# Patient Record
Sex: FEMALE | Race: WHITE | NOT HISPANIC OR LATINO | Employment: OTHER | URBAN - METROPOLITAN AREA
[De-identification: names, ages, dates, MRNs, and addresses within clinical notes are randomized per-mention and may not be internally consistent; named-entity substitution may affect disease eponyms.]

---

## 2023-10-03 ENCOUNTER — APPOINTMENT (OUTPATIENT)
Dept: RADIOLOGY | Facility: MEDICAL CENTER | Age: 68
DRG: 193 | End: 2023-10-03
Attending: EMERGENCY MEDICINE
Payer: MEDICARE

## 2023-10-04 ENCOUNTER — APPOINTMENT (OUTPATIENT)
Dept: RADIOLOGY | Facility: MEDICAL CENTER | Age: 68
DRG: 193 | End: 2023-10-04
Attending: EMERGENCY MEDICINE
Payer: MEDICARE

## 2023-10-04 ENCOUNTER — OFFICE VISIT (OUTPATIENT)
Dept: URGENT CARE | Facility: PHYSICIAN GROUP | Age: 68
End: 2023-10-04
Payer: MEDICARE

## 2023-10-04 ENCOUNTER — HOSPITAL ENCOUNTER (INPATIENT)
Facility: MEDICAL CENTER | Age: 68
LOS: 2 days | DRG: 193 | End: 2023-10-06
Attending: EMERGENCY MEDICINE | Admitting: STUDENT IN AN ORGANIZED HEALTH CARE EDUCATION/TRAINING PROGRAM
Payer: MEDICARE

## 2023-10-04 ENCOUNTER — APPOINTMENT (OUTPATIENT)
Dept: RADIOLOGY | Facility: IMAGING CENTER | Age: 68
End: 2023-10-04
Attending: NURSE PRACTITIONER
Payer: MEDICARE

## 2023-10-04 VITALS
SYSTOLIC BLOOD PRESSURE: 96 MMHG | BODY MASS INDEX: 22.09 KG/M2 | HEART RATE: 102 BPM | HEIGHT: 61 IN | DIASTOLIC BLOOD PRESSURE: 52 MMHG | RESPIRATION RATE: 18 BRPM | TEMPERATURE: 98.3 F | OXYGEN SATURATION: 95 % | WEIGHT: 117 LBS

## 2023-10-04 DIAGNOSIS — R06.02 SOB (SHORTNESS OF BREATH): ICD-10-CM

## 2023-10-04 DIAGNOSIS — J44.1 ACUTE EXACERBATION OF CHRONIC OBSTRUCTIVE PULMONARY DISEASE (COPD) (HCC): ICD-10-CM

## 2023-10-04 DIAGNOSIS — J98.8 VIRAL RESPIRATORY INFECTION: ICD-10-CM

## 2023-10-04 DIAGNOSIS — J90 PLEURAL EFFUSION ON RIGHT: ICD-10-CM

## 2023-10-04 DIAGNOSIS — J90 PLEURAL EFFUSION: ICD-10-CM

## 2023-10-04 DIAGNOSIS — B97.89 VIRAL RESPIRATORY INFECTION: ICD-10-CM

## 2023-10-04 PROBLEM — J96.01 ACUTE HYPOXEMIC RESPIRATORY FAILURE (HCC): Status: ACTIVE | Noted: 2023-10-04

## 2023-10-04 PROBLEM — Z72.0 TOBACCO ABUSE: Status: ACTIVE | Noted: 2023-10-04

## 2023-10-04 PROBLEM — Z71.89 ACP (ADVANCE CARE PLANNING): Status: ACTIVE | Noted: 2023-10-04

## 2023-10-04 LAB
ALBUMIN SERPL BCP-MCNC: 4.7 G/DL (ref 3.2–4.9)
ALBUMIN/GLOB SERPL: 1.7 G/DL
ALP SERPL-CCNC: 86 U/L (ref 30–99)
ALT SERPL-CCNC: 14 U/L (ref 2–50)
ANION GAP SERPL CALC-SCNC: 14 MMOL/L (ref 7–16)
APPEARANCE UR: CLEAR
APTT PPP: 28.6 SEC (ref 24.7–36)
AST SERPL-CCNC: 21 U/L (ref 12–45)
BACTERIA #/AREA URNS HPF: ABNORMAL /HPF
BASOPHILS # BLD AUTO: 0.3 % (ref 0–1.8)
BASOPHILS # BLD: 0.05 K/UL (ref 0–0.12)
BILIRUB SERPL-MCNC: 0.3 MG/DL (ref 0.1–1.5)
BILIRUB UR QL STRIP.AUTO: NEGATIVE
BUN SERPL-MCNC: 7 MG/DL (ref 8–22)
CALCIUM ALBUM COR SERPL-MCNC: 8.9 MG/DL (ref 8.5–10.5)
CALCIUM SERPL-MCNC: 9.5 MG/DL (ref 8.5–10.5)
CHLORIDE SERPL-SCNC: 91 MMOL/L (ref 96–112)
CO2 SERPL-SCNC: 23 MMOL/L (ref 20–33)
COLOR UR: YELLOW
CREAT SERPL-MCNC: 0.47 MG/DL (ref 0.5–1.4)
EOSINOPHIL # BLD AUTO: 0 K/UL (ref 0–0.51)
EOSINOPHIL NFR BLD: 0 % (ref 0–6.9)
EPI CELLS #/AREA URNS HPF: ABNORMAL /HPF
ERYTHROCYTE [DISTWIDTH] IN BLOOD BY AUTOMATED COUNT: 43.4 FL (ref 35.9–50)
FLUAV RNA SPEC QL NAA+PROBE: NEGATIVE
FLUBV RNA SPEC QL NAA+PROBE: NEGATIVE
GFR SERPLBLD CREATININE-BSD FMLA CKD-EPI: 104 ML/MIN/1.73 M 2
GLOBULIN SER CALC-MCNC: 2.8 G/DL (ref 1.9–3.5)
GLUCOSE SERPL-MCNC: 112 MG/DL (ref 65–99)
GLUCOSE UR STRIP.AUTO-MCNC: NEGATIVE MG/DL
HCT VFR BLD AUTO: 44 % (ref 37–47)
HGB BLD-MCNC: 15.3 G/DL (ref 12–16)
HYALINE CASTS #/AREA URNS LPF: ABNORMAL /LPF
IMM GRANULOCYTES # BLD AUTO: 0.06 K/UL (ref 0–0.11)
IMM GRANULOCYTES NFR BLD AUTO: 0.4 % (ref 0–0.9)
INR PPP: 1.07 (ref 0.87–1.13)
KETONES UR STRIP.AUTO-MCNC: 40 MG/DL
LACTATE SERPL-SCNC: 1.6 MMOL/L (ref 0.5–2)
LEUKOCYTE ESTERASE UR QL STRIP.AUTO: NEGATIVE
LYMPHOCYTES # BLD AUTO: 1.01 K/UL (ref 1–4.8)
LYMPHOCYTES NFR BLD: 7 % (ref 22–41)
MCH RBC QN AUTO: 32.5 PG (ref 27–33)
MCHC RBC AUTO-ENTMCNC: 34.8 G/DL (ref 32.2–35.5)
MCV RBC AUTO: 93.4 FL (ref 81.4–97.8)
MICRO URNS: ABNORMAL
MONOCYTES # BLD AUTO: 1.32 K/UL (ref 0–0.85)
MONOCYTES NFR BLD AUTO: 9.2 % (ref 0–13.4)
NEUTROPHILS # BLD AUTO: 11.93 K/UL (ref 1.82–7.42)
NEUTROPHILS NFR BLD: 83.1 % (ref 44–72)
NITRITE UR QL STRIP.AUTO: NEGATIVE
NRBC # BLD AUTO: 0 K/UL
NRBC BLD-RTO: 0 /100 WBC (ref 0–0.2)
PH UR STRIP.AUTO: 6.5 [PH] (ref 5–8)
PLATELET # BLD AUTO: 311 K/UL (ref 164–446)
PMV BLD AUTO: 9.1 FL (ref 9–12.9)
POTASSIUM SERPL-SCNC: 4 MMOL/L (ref 3.6–5.5)
PROCALCITONIN SERPL-MCNC: 0.07 NG/ML
PROT SERPL-MCNC: 7.5 G/DL (ref 6–8.2)
PROT UR QL STRIP: 30 MG/DL
PROTHROMBIN TIME: 14 SEC (ref 12–14.6)
RBC # BLD AUTO: 4.71 M/UL (ref 4.2–5.4)
RBC # URNS HPF: ABNORMAL /HPF
RBC UR QL AUTO: ABNORMAL
RENAL EPI CELLS #/AREA URNS HPF: ABNORMAL /HPF
RSV RNA SPEC QL NAA+PROBE: NEGATIVE
SARS-COV-2 RNA RESP QL NAA+PROBE: NEGATIVE
SODIUM SERPL-SCNC: 128 MMOL/L (ref 135–145)
SP GR UR STRIP.AUTO: 1.01
UROBILINOGEN UR STRIP.AUTO-MCNC: 0.2 MG/DL
WBC # BLD AUTO: 14.4 K/UL (ref 4.8–10.8)
WBC #/AREA URNS HPF: ABNORMAL /HPF

## 2023-10-04 PROCEDURE — 85730 THROMBOPLASTIN TIME PARTIAL: CPT

## 2023-10-04 PROCEDURE — 87040 BLOOD CULTURE FOR BACTERIA: CPT

## 2023-10-04 PROCEDURE — 83605 ASSAY OF LACTIC ACID: CPT

## 2023-10-04 PROCEDURE — 85025 COMPLETE CBC W/AUTO DIFF WBC: CPT

## 2023-10-04 PROCEDURE — 99406 BEHAV CHNG SMOKING 3-10 MIN: CPT | Performed by: STUDENT IN AN ORGANIZED HEALTH CARE EDUCATION/TRAINING PROGRAM

## 2023-10-04 PROCEDURE — 94644 CONT INHLJ TX 1ST HOUR: CPT

## 2023-10-04 PROCEDURE — 700111 HCHG RX REV CODE 636 W/ 250 OVERRIDE (IP): Mod: JZ | Performed by: EMERGENCY MEDICINE

## 2023-10-04 PROCEDURE — 96367 TX/PROPH/DG ADDL SEQ IV INF: CPT

## 2023-10-04 PROCEDURE — 96375 TX/PRO/DX INJ NEW DRUG ADDON: CPT

## 2023-10-04 PROCEDURE — 81001 URINALYSIS AUTO W/SCOPE: CPT

## 2023-10-04 PROCEDURE — 99497 ADVNCD CARE PLAN 30 MIN: CPT | Mod: 25 | Performed by: STUDENT IN AN ORGANIZED HEALTH CARE EDUCATION/TRAINING PROGRAM

## 2023-10-04 PROCEDURE — 71275 CT ANGIOGRAPHY CHEST: CPT

## 2023-10-04 PROCEDURE — 99223 1ST HOSP IP/OBS HIGH 75: CPT | Mod: 25,AI | Performed by: STUDENT IN AN ORGANIZED HEALTH CARE EDUCATION/TRAINING PROGRAM

## 2023-10-04 PROCEDURE — 84145 PROCALCITONIN (PCT): CPT

## 2023-10-04 PROCEDURE — 96365 THER/PROPH/DIAG IV INF INIT: CPT

## 2023-10-04 PROCEDURE — 770020 HCHG ROOM/CARE - TELE (206)

## 2023-10-04 PROCEDURE — 87086 URINE CULTURE/COLONY COUNT: CPT

## 2023-10-04 PROCEDURE — 700117 HCHG RX CONTRAST REV CODE 255: Performed by: EMERGENCY MEDICINE

## 2023-10-04 PROCEDURE — 700105 HCHG RX REV CODE 258: Performed by: EMERGENCY MEDICINE

## 2023-10-04 PROCEDURE — 80053 COMPREHEN METABOLIC PANEL: CPT

## 2023-10-04 PROCEDURE — 700111 HCHG RX REV CODE 636 W/ 250 OVERRIDE (IP): Mod: JZ | Performed by: STUDENT IN AN ORGANIZED HEALTH CARE EDUCATION/TRAINING PROGRAM

## 2023-10-04 PROCEDURE — 99204 OFFICE O/P NEW MOD 45 MIN: CPT | Mod: 25 | Performed by: NURSE PRACTITIONER

## 2023-10-04 PROCEDURE — 0241U POCT CEPHEID COV-2, FLU A/B, RSV - PCR: CPT | Performed by: NURSE PRACTITIONER

## 2023-10-04 PROCEDURE — 99406 BEHAV CHNG SMOKING 3-10 MIN: CPT

## 2023-10-04 PROCEDURE — 36415 COLL VENOUS BLD VENIPUNCTURE: CPT

## 2023-10-04 PROCEDURE — 94640 AIRWAY INHALATION TREATMENT: CPT | Performed by: NURSE PRACTITIONER

## 2023-10-04 PROCEDURE — 71046 X-RAY EXAM CHEST 2 VIEWS: CPT | Mod: TC | Performed by: NURSE PRACTITIONER

## 2023-10-04 PROCEDURE — 85610 PROTHROMBIN TIME: CPT

## 2023-10-04 PROCEDURE — 700101 HCHG RX REV CODE 250: Performed by: EMERGENCY MEDICINE

## 2023-10-04 PROCEDURE — 3078F DIAST BP <80 MM HG: CPT | Performed by: NURSE PRACTITIONER

## 2023-10-04 PROCEDURE — 3074F SYST BP LT 130 MM HG: CPT | Performed by: NURSE PRACTITIONER

## 2023-10-04 PROCEDURE — 99285 EMERGENCY DEPT VISIT HI MDM: CPT

## 2023-10-04 RX ORDER — ALBUTEROL SULFATE 90 UG/1
1-2 AEROSOL, METERED RESPIRATORY (INHALATION) EVERY 6 HOURS PRN
COMMUNITY

## 2023-10-04 RX ORDER — METHYLPREDNISOLONE SODIUM SUCCINATE 40 MG/ML
40 INJECTION, POWDER, LYOPHILIZED, FOR SOLUTION INTRAMUSCULAR; INTRAVENOUS ONCE
Status: COMPLETED | OUTPATIENT
Start: 2023-10-04 | End: 2023-10-04

## 2023-10-04 RX ORDER — IPRATROPIUM BROMIDE AND ALBUTEROL SULFATE 2.5; .5 MG/3ML; MG/3ML
3 SOLUTION RESPIRATORY (INHALATION)
Status: DISPENSED | OUTPATIENT
Start: 2023-10-04 | End: 2023-10-05

## 2023-10-04 RX ORDER — ALBUTEROL SULFATE 2.5 MG/3ML
2.5 SOLUTION RESPIRATORY (INHALATION) ONCE
Status: COMPLETED | OUTPATIENT
Start: 2023-10-04 | End: 2023-10-04

## 2023-10-04 RX ORDER — METHYLPREDNISOLONE SODIUM SUCCINATE 40 MG/ML
40 INJECTION, POWDER, LYOPHILIZED, FOR SOLUTION INTRAMUSCULAR; INTRAVENOUS EVERY 8 HOURS
Status: DISCONTINUED | OUTPATIENT
Start: 2023-10-04 | End: 2023-10-05

## 2023-10-04 RX ORDER — ACETAMINOPHEN 325 MG/1
650 TABLET ORAL EVERY 6 HOURS PRN
Status: DISCONTINUED | OUTPATIENT
Start: 2023-10-04 | End: 2023-10-06 | Stop reason: HOSPADM

## 2023-10-04 RX ORDER — AZITHROMYCIN 500 MG/5ML
500 INJECTION, POWDER, LYOPHILIZED, FOR SOLUTION INTRAVENOUS EVERY 24 HOURS
Status: DISCONTINUED | OUTPATIENT
Start: 2023-10-04 | End: 2023-10-04

## 2023-10-04 RX ORDER — SODIUM CHLORIDE 9 MG/ML
1000 INJECTION, SOLUTION INTRAVENOUS ONCE
Status: COMPLETED | OUTPATIENT
Start: 2023-10-04 | End: 2023-10-04

## 2023-10-04 RX ORDER — B-COMPLEX WITH VITAMIN C
1 TABLET ORAL EVERY MORNING
COMMUNITY

## 2023-10-04 RX ORDER — AZITHROMYCIN 500 MG/5ML
500 INJECTION, POWDER, LYOPHILIZED, FOR SOLUTION INTRAVENOUS ONCE
Status: COMPLETED | OUTPATIENT
Start: 2023-10-04 | End: 2023-10-04

## 2023-10-04 RX ORDER — DM/PE/ACETAMINOPHEN/DOXYLAMINE 5-325-6.25
2 CAPSULE ORAL EVERY 4 HOURS PRN
COMMUNITY
End: 2023-10-13

## 2023-10-04 RX ORDER — PSEUDOEPHEDRINE HCL 30 MG
60 TABLET ORAL EVERY 4 HOURS PRN
COMMUNITY
End: 2023-10-13

## 2023-10-04 RX ADMIN — IOHEXOL 60 ML: 350 INJECTION, SOLUTION INTRAVENOUS at 16:05

## 2023-10-04 RX ADMIN — IPRATROPIUM BROMIDE 0.5 MG: 0.5 SOLUTION RESPIRATORY (INHALATION) at 14:49

## 2023-10-04 RX ADMIN — METHYLPREDNISOLONE SODIUM SUCCINATE 40 MG: 40 INJECTION, POWDER, FOR SOLUTION INTRAMUSCULAR; INTRAVENOUS at 22:14

## 2023-10-04 RX ADMIN — ALBUTEROL SULFATE 2.5 MG: 2.5 SOLUTION RESPIRATORY (INHALATION) at 11:43

## 2023-10-04 RX ADMIN — AZITHROMYCIN 500 MG: 500 INJECTION, POWDER, LYOPHILIZED, FOR SOLUTION INTRAVENOUS at 15:33

## 2023-10-04 RX ADMIN — Medication 15 MG/HR: at 14:48

## 2023-10-04 RX ADMIN — METHYLPREDNISOLONE SODIUM SUCCINATE 40 MG: 40 INJECTION, POWDER, FOR SOLUTION INTRAMUSCULAR; INTRAVENOUS at 14:38

## 2023-10-04 RX ADMIN — SODIUM CHLORIDE 1000 ML: 9 INJECTION, SOLUTION INTRAVENOUS at 14:42

## 2023-10-04 RX ADMIN — AMPICILLIN AND SULBACTAM 3 G: 1; 2 INJECTION, POWDER, FOR SOLUTION INTRAMUSCULAR; INTRAVENOUS at 14:39

## 2023-10-04 ASSESSMENT — ENCOUNTER SYMPTOMS
GASTROINTESTINAL NEGATIVE: 1
MUSCULOSKELETAL NEGATIVE: 1
SHORTNESS OF BREATH: 1
DIARRHEA: 0
SORE THROAT: 1
EYES NEGATIVE: 1
COUGH: 1
FEVER: 1
CARDIOVASCULAR NEGATIVE: 1
HEMOPTYSIS: 0
PSYCHIATRIC NEGATIVE: 1
SPUTUM PRODUCTION: 1
SHORTNESS OF BREATH: 1
NEUROLOGICAL NEGATIVE: 1
VOMITING: 0
HEADACHES: 1
WHEEZING: 1

## 2023-10-04 ASSESSMENT — FIBROSIS 4 INDEX: FIB4 SCORE: 1.23

## 2023-10-04 ASSESSMENT — COPD QUESTIONNAIRES: COPD: 0

## 2023-10-04 NOTE — ED NOTES
Pharmacy Medication Reconciliation      ~Medication reconciliation updated and complete per patient at bedside   ~Allergies have been verified and updated   ~No oral ABX within the last 30 days  ~Patient home pharmacy :  [facility]      ~Anticoagulants (rivaroxaban, apixaban, edoxaban, dabigatran, warfarin, enoxaparin) taken in the last 14 days? No

## 2023-10-04 NOTE — H&P
Hospital Medicine History & Physical Note    Date of Service  10/4/2023    Primary Care Physician  Pcp Pt States None    Consultants  None    Code Status  Full Code    Chief Complaint  Chief Complaint   Patient presents with    Shortness of Breath     x3days    Cough    Sent from Urgent Care       History of Presenting Illness  Cyndee Mosley is a 68 y.o. female who presented 10/4/2023 with shortness of breath.  Patient has a 50-pack-year smoking history and states that she is always short of breath.  She has never been checked for COPD in the past.  However for the last 3 days, she has reported progressively worsening shortness of breath and generalized weakness and chills.  She decided to come to ER for evaluation    In ER, patient tachycardic and hypoxic.  Found to have white blood cell count 14, sodium 128.  Procalcitonin negative.  COVID-negative.  CTPA showing negative pulmonary embolism, however moderately large right pleural effusion and partial collapse of the right lower lobe noted.    I discussed the plan of care with patient.    Review of Systems  Review of Systems   Constitutional:  Positive for malaise/fatigue.   HENT: Negative.     Eyes: Negative.    Respiratory:  Positive for shortness of breath.    Cardiovascular: Negative.    Gastrointestinal: Negative.    Genitourinary: Negative.    Musculoskeletal: Negative.    Skin: Negative.    Neurological: Negative.    Endo/Heme/Allergies: Negative.    Psychiatric/Behavioral: Negative.         Past Medical History   has no past medical history on file.    Surgical History   has no past surgical history on file.     Family History  family history is not on file.   Family history reviewed with patient. There is no family history that is pertinent to the chief complaint.     Social History       Allergies  Allergies   Allergen Reactions    Sulfa Drugs Rash     Rash         Medications  Prior to Admission Medications   Prescriptions Last Dose Informant  Patient Reported? Taking?   Ascorbic Acid (VITAMIN C PO) 10/3/2023 at AM Patient Yes Yes   Sig: Take 2 Tablets by mouth every morning.   B Complex Vitamins (VITAMIN B COMPLEX) Tab 10/3/2023 at AM Patient Yes Yes   Sig: Take 1 Tablet by mouth every morning.   Cholecalciferol (VITAMIN D3 PO) 10/3/2023 at AM Patient Yes Yes   Sig: Take 1 Tablet by mouth every morning.   Misc Natural Products (PUMPKIN SEED OIL PO) 10/3/2023 at AM Patient Yes Yes   Sig: Take 1 Tablet by mouth every morning.   Multiple Vitamins-Minerals (AIRBORNE GUMMIES PO) 10/3/2023 at AM Patient Yes Yes   Sig: Take 1 Tablet by mouth every morning.   Multiple Vitamins-Minerals (HAIR SKIN AND NAILS FORMULA) Tab 10/3/2023 at AM Patient Yes Yes   Sig: Take 2 Tablets by mouth every morning.   Omega-3 Fatty Acids (FISH OIL PO) 10/3/2023 at AM Patient Yes Yes   Sig: Take 1 Capsule by mouth every morning.   Phenyleph-Doxylamine-DM-APAP (JEANNINE-CONTRERAS PLS ALLERGY & CGH) 5-6.25- MG Cap 10/3/2023 at AM Patient Yes Yes   Sig: Take 2 Tablets by mouth every four hours as needed (cold).   Prenatal Vit-Fe Fumarate-FA (PRENATAL PO) 10/3/2023 at AM Patient Yes Yes   Sig: Take 1 Tablet by mouth every morning.   albuterol 108 (90 Base) MCG/ACT Aero Soln inhalation aerosol 10/3/2023 at PM Patient Yes Yes   Sig: Inhale 1-2 Puffs every 6 hours as needed for Shortness of Breath.   pseudoephedrine (SUDAFED) 30 MG Tab 10/3/2023 at AM Patient Yes Yes   Sig: Take 60 mg by mouth every four hours as needed for Congestion.      Facility-Administered Medications: None       Physical Exam  Temp:  [36.8 °C (98.3 °F)-37.8 °C (100.1 °F)] 37.8 °C (100.1 °F)  Pulse:  [] 102  Resp:  [16-26] 20  BP: ()/(52-80) 132/61  SpO2:  [87 %-99 %] 96 %  Blood Pressure : 132/61   Temperature: 37.8 °C (100.1 °F)   Pulse: (!) 102   Respiration: 20   Pulse Oximetry: 96 %       Physical Exam  Constitutional:       Appearance: She is normal weight.   HENT:      Head: Normocephalic.       "Nose: Nose normal.      Mouth/Throat:      Mouth: Mucous membranes are moist.   Eyes:      Pupils: Pupils are equal, round, and reactive to light.   Cardiovascular:      Rate and Rhythm: Regular rhythm.   Pulmonary:      Comments: Tachypneic with respiratory rate between 20 and 28  Diffuse inspiratory and expiratory wheezing  Diminished breath sounds at lung bases on right side  Abdominal:      General: Abdomen is flat.      Palpations: Abdomen is soft.   Musculoskeletal:         General: Normal range of motion.      Cervical back: Neck supple.   Skin:     General: Skin is warm.   Neurological:      General: No focal deficit present.      Mental Status: She is alert and oriented to person, place, and time.   Psychiatric:         Mood and Affect: Mood normal.         Behavior: Behavior normal.         Thought Content: Thought content normal.         Judgment: Judgment normal.         Laboratory:  Recent Labs     10/04/23  1226   WBC 14.4*   RBC 4.71   HEMOGLOBIN 15.3   HEMATOCRIT 44.0   MCV 93.4   MCH 32.5   MCHC 34.8   RDW 43.4   PLATELETCT 311   MPV 9.1     Recent Labs     10/04/23  1226   SODIUM 128*   POTASSIUM 4.0   CHLORIDE 91*   CO2 23   GLUCOSE 112*   BUN 7*   CREATININE 0.47*   CALCIUM 9.5     Recent Labs     10/04/23  1226   ALTSGPT 14   ASTSGOT 21   ALKPHOSPHAT 86   TBILIRUBIN 0.3   GLUCOSE 112*         No results for input(s): \"NTPROBNP\" in the last 72 hours.      No results for input(s): \"TROPONINT\" in the last 72 hours.    Imaging:  CT-CTA CHEST PULMONARY ARTERY W/ RECONS   Final Result      1.  No CT evidence of pulmonary emboli.      2.  Moderately large right pleural effusion.      3.  Partial collapse of the right lower lobe.                    Assessment/Plan:  Justification for Admission Status  I anticipate this patient will require at least two midnights for appropriate medical management, necessitating inpatient admission because patient has acute hypoxemic respiratory failure    Patient will " need a Telemetry bed on MEDICAL service .  The need is secondary to acute hypoxemic respiratory failure.    * Acute hypoxemic respiratory failure (HCC)- (present on admission)  Assessment & Plan  Spoke to ERP.  Presents with shortness of breath and found to be hypoxic.  Sodium 128, likely from fluid overload.  CTPA showing moderate right-sided pleural effusion and partial collapse of the right lower lobe.  Patient to be admitted for diagnostic and therapeutic thoracentesis and for COPD exacerbation.  Oxygen as needed.  IR consult in a.m. for thoracentesis.    Acute exacerbation of chronic obstructive pulmonary disease (COPD) (HCC)  Assessment & Plan  Patient noted to have mild diffuse inspiratory and expiratory wheezing.  Patient has a 50 pack smoking history and likely has undiagnosed COPD.  She has never been checked for COPD in the past.  DuoNeb/Solu-Medrol    ACP (advance care planning)  Assessment & Plan  16 minutes spent discussing goals of care with patient and her  at bedside.  She was explained her clinical diagnosis and treatment plan moving forward.  She states that she like to be full code and have everything done, including chest compressions and intubation    Tobacco abuse  Assessment & Plan  4 minutes spent on tobacco cessation.  She smokes a pack of cigarettes a day for the last 50 years.  She was offered nicotine replacement therapy, such as nicotine patch/chewing gum/etc.  At this time she declines.        VTE prophylaxis: pharmacologic prophylaxis contraindicated due to thoracocentesis in a.m.

## 2023-10-04 NOTE — ED NOTES
Chief Complaint   Patient presents with    Shortness of Breath     x3days    Cough    Sent from Urgent Care     Pt ambulated to triage with above complaints, pt came from urgent care and had chest xray showed right pleural effusion. Spo2 87% on room air. She was placed on 2L bc  Sepsis score=3, protocol initiated.

## 2023-10-04 NOTE — ED PROVIDER NOTES
ED Provider Note    CHIEF COMPLAINT  Chief Complaint   Patient presents with    Shortness of Breath     x3days    Cough    Sent from Urgent Care       EXTERNAL RECORDS REVIEWED  I did review the x-ray from outlying facility revealed evidence of slight right pleural effusion    HPI/ROS    OUTSIDE HISTORIAN(S):  Significant other patient's  at bedside Sofía history review of systems states she had increasing shortness of breath over the last 3 days.    Cyndee Mosley is a 68 y.o. female who presents with complaint of shortness of breath, cough for 3 days.  The patient is increasing shortness of breath cough for 3 days.  She does have a history of smoking 1 pack/day for several years but never diagnosed with COPD. patient denies chest pain, swelling lower extremity, fever, productive cough.  The patient was seen at urgent care and a RSV, influenza and COVID test were completed that were negative for viral infection.  X-ray revealed evidence of pleural effusion the patient was sent to our facility for evaluation.  She does not wear oxygen at home.    PAST MEDICAL HISTORY       SURGICAL HISTORY  patient denies any surgical history    FAMILY HISTORY  No family history on file.    SOCIAL HISTORY  Social History     Tobacco Use    Smoking status: Not on file    Smokeless tobacco: Not on file   Substance and Sexual Activity    Alcohol use: Not on file    Drug use: Not on file    Sexual activity: Not on file       CURRENT MEDICATIONS  Home Medications       Reviewed by Bridgette Crane (Pharmacy Tech) on 10/04/23 at 1441  Med List Status: Complete     Medication Last Dose Status   albuterol 108 (90 Base) MCG/ACT Aero Soln inhalation aerosol 10/3/2023 Active   Ascorbic Acid (VITAMIN C PO) 10/3/2023 Active   B Complex Vitamins (VITAMIN B COMPLEX) Tab 10/3/2023 Active   Cholecalciferol (VITAMIN D3 PO) 10/3/2023 Active   Misc Natural Products (PUMPKIN SEED OIL PO) 10/3/2023 Active   Multiple Vitamins-Minerals  "(AIRBORNE GUMMIES PO) 10/3/2023 Active   Multiple Vitamins-Minerals (HAIR SKIN AND NAILS FORMULA) Tab 10/3/2023 Active   Omega-3 Fatty Acids (FISH OIL PO) 10/3/2023 Active   Phenyleph-Doxylamine-DM-APAP (JEANNINE-SELTZER PLS ALLERGY & CGH) 5-6.25- MG Cap 10/3/2023 Active   Prenatal Vit-Fe Fumarate-FA (PRENATAL PO) 10/3/2023 Active   pseudoephedrine (SUDAFED) 30 MG Tab 10/3/2023 Active                    ALLERGIES  Allergies   Allergen Reactions    Sulfa Drugs Rash     Rash         PHYSICAL EXAM  VITAL SIGNS: BP (!) 141/67   Pulse 91   Temp 37.8 °C (100.1 °F) (Temporal)   Resp 18   Ht 1.549 m (5' 1\")   Wt 53.2 kg (117 lb 4.6 oz)   SpO2 99%   BMI 22.16 kg/m²      Nursing notes and vitals reviewed.  Constitutional: Well developed, Well nourished, moderate respiratory distress, Non-toxic appearance.   Eyes: PERRLA, EOMI, Conjunctiva normal, No discharge.   HENT: Normocephalic, Atraumatic, moist mucous membranes, no facial edema Cardiovascular: Normal heart rate, Normal rhythm, No murmurs, No rubs, No gallops.   Thorax & Lungs: Moderate respiratory distress, increased work of breathing, rales and rhonchi with profound wheezing upper lower lung fields bilaterally, slightly tripoding, 3 word dyspnea   Abdomen: Bowel sounds normal, Soft, No tenderness, No guarding, No rebound, No masses, No pulsatile masses.   Skin: Warm, Dry, No erythema, No rash.   Extremities: No deformity, no pedal edema, good range of motion range of motion upper lower extremes bilaterally  Neurologic: Alert & oriented x 3, no focal abnormalities noted, acting appropriately on examination  Psychiatric: Affect normal for clinical presentation.      DIAGNOSTIC STUDIES / PROCEDURES  EKG  I have independently interpreted this EKG  Sinus rhythm on monitor    LABS  Results for orders placed or performed during the hospital encounter of 10/04/23   Lactic acid (lactate)   Result Value Ref Range    Lactic Acid 1.6 0.5 - 2.0 mmol/L   CBC With " Differential   Result Value Ref Range    WBC 14.4 (H) 4.8 - 10.8 K/uL    RBC 4.71 4.20 - 5.40 M/uL    Hemoglobin 15.3 12.0 - 16.0 g/dL    Hematocrit 44.0 37.0 - 47.0 %    MCV 93.4 81.4 - 97.8 fL    MCH 32.5 27.0 - 33.0 pg    MCHC 34.8 32.2 - 35.5 g/dL    RDW 43.4 35.9 - 50.0 fL    Platelet Count 311 164 - 446 K/uL    MPV 9.1 9.0 - 12.9 fL    Neutrophils-Polys 83.10 (H) 44.00 - 72.00 %    Lymphocytes 7.00 (L) 22.00 - 41.00 %    Monocytes 9.20 0.00 - 13.40 %    Eosinophils 0.00 0.00 - 6.90 %    Basophils 0.30 0.00 - 1.80 %    Immature Granulocytes 0.40 0.00 - 0.90 %    Nucleated RBC 0.00 0.00 - 0.20 /100 WBC    Neutrophils (Absolute) 11.93 (H) 1.82 - 7.42 K/uL    Lymphs (Absolute) 1.01 1.00 - 4.80 K/uL    Monos (Absolute) 1.32 (H) 0.00 - 0.85 K/uL    Eos (Absolute) 0.00 0.00 - 0.51 K/uL    Baso (Absolute) 0.05 0.00 - 0.12 K/uL    Immature Granulocytes (abs) 0.06 0.00 - 0.11 K/uL    NRBC (Absolute) 0.00 K/uL   Comp Metabolic Panel   Result Value Ref Range    Sodium 128 (L) 135 - 145 mmol/L    Potassium 4.0 3.6 - 5.5 mmol/L    Chloride 91 (L) 96 - 112 mmol/L    Co2 23 20 - 33 mmol/L    Anion Gap 14.0 7.0 - 16.0    Glucose 112 (H) 65 - 99 mg/dL    Bun 7 (L) 8 - 22 mg/dL    Creatinine 0.47 (L) 0.50 - 1.40 mg/dL    Calcium 9.5 8.5 - 10.5 mg/dL    Correct Calcium 8.9 8.5 - 10.5 mg/dL    AST(SGOT) 21 12 - 45 U/L    ALT(SGPT) 14 2 - 50 U/L    Alkaline Phosphatase 86 30 - 99 U/L    Total Bilirubin 0.3 0.1 - 1.5 mg/dL    Albumin 4.7 3.2 - 4.9 g/dL    Total Protein 7.5 6.0 - 8.2 g/dL    Globulin 2.8 1.9 - 3.5 g/dL    A-G Ratio 1.7 g/dL   ESTIMATED GFR   Result Value Ref Range    GFR (CKD-EPI) 104 >60 mL/min/1.73 m 2   PROCALCITONIN   Result Value Ref Range    Procalcitonin 0.07 <0.25 ng/mL         RADIOLOGY  I have independently interpreted the diagnostic imaging associated with this visit and am waiting the final reading from the radiologist.   My preliminary interpretation is as follows: CT scan pulmonary angiogram of the  chest significant pleural effusion on the right, no pneumothorax  Radiologist interpretation:   CT-CTA CHEST PULMONARY ARTERY W/ RECONS   Final Result      1.  No CT evidence of pulmonary emboli.      2.  Moderately large right pleural effusion.      3.  Partial collapse of the right lower lobe.            EC-ECHOCARDIOGRAM COMPLETE W/O CONT    (Results Pending)   US-THORACENTESIS PUNCTURE RIGHT    (Results Pending)         COURSE & MEDICAL DECISION MAKING    ED Observation Status? No; Patient does not meet criteria for ED Observation.     INITIAL ASSESSMENT, COURSE AND PLAN  Care Narrative: This is a pleasant 60-year-old female presents with hypoxia.  Here in the emergency room, patient received medication in the form of a Solu-Medrol continuous albuterol nebulized solution and 1 Atrovent nebulizer solution treatment.  X-ray was reviewed and showed a slight pleural effusion on the right.  I was concerned secon the patient's hypoxia and presentation she may have a pulm embolism or profound pulmonary infection, pleural effusion.  For this reason CTA pulmonary angiogram was completed.  CT pulm angio was negative for pulm embolism and the patient does have evidence of a moderately large right pleural effusion and partial collapse of right middle lobe.  I do believe is causing her symptoms.  Initially she received antibiotics secondary to probable infection.  The patient is on 2 L nasal cannula and is now speaking to me in full sentences and received albuterol continuous nebulized solution, IV steroids as well as Atrovent.  The patient was discussed with Dr. Denae call.  We discussed the possibility of thoracentesis at this point I do not believe she needs an emergent thoracentesis here in the emergency department and recommended she go to a more controlled environment with interventional radiology.  Possible the patient has a cancerous lesion is imperative that she has evaluation of the pleural effusion and  this will be occurring in this hospitalization.  This was discussed and agreed upon with Dr. Philippe.    CRITICAL CARE  The very real possibilty of a deterioration of this patient's condition required the highest level of my preparedness for sudden, emergent intervention.  I provided critical care services, which included medication orders, frequent reevaluations of the patient's condition and response to treatment, ordering and reviewing test results, and discussing the case with various consultants.  The critical care time associated with the care of the patient was 45 minutes. Review chart for interventions. This time is exclusive of any other billable procedures.        ADDITIONAL PROBLEM LIST  Smoking history  DISPOSITION AND DISCUSSIONS  I have discussed management of the patient with the following physicians and JOSE's: Dr. Philippe with hospitalist group        FINAL DIAGNOSIS  Shortness of breath  Hypoxia  Pleural effusion  Possible pneumonia  Critical care time 45 minutes    Electronically signed by: Deandre Noble D.O., 10/4/2023 3:51 PM

## 2023-10-04 NOTE — PROGRESS NOTES
Subjective:     Cyndee Mosley is a 68 y.o. female who presents for Cough (Congested,Fatigue, Body Aches, Fever, SOBx 3 days)      Symptoms started 3 days ago, stating she thought it might be a sinus infection at first. Chest pain with cough. Alcaseltzer and sudafed. States possible hx emphysema, current smoker.     Cough  This is a new problem. The current episode started in the past 7 days. Associated symptoms include a fever, headaches, a sore throat, shortness of breath and wheezing. Pertinent negatives include no hemoptysis. There is no history of asthma, COPD or pneumonia.       History reviewed. No pertinent past medical history.    History reviewed. No pertinent surgical history.    Social History     Socioeconomic History    Marital status:      Spouse name: Not on file    Number of children: Not on file    Years of education: Not on file    Highest education level: Not on file   Occupational History    Not on file   Tobacco Use    Smoking status: Not on file    Smokeless tobacco: Not on file   Substance and Sexual Activity    Alcohol use: Not on file    Drug use: Not on file    Sexual activity: Not on file   Other Topics Concern    Not on file   Social History Narrative    Not on file     Social Determinants of Health     Financial Resource Strain: Not on file   Food Insecurity: Not on file   Transportation Needs: Not on file   Physical Activity: Not on file   Stress: Not on file   Social Connections: Not on file   Intimate Partner Violence: Not on file   Housing Stability: Not on file        History reviewed. No pertinent family history.     Allergies   Allergen Reactions    Sulfa Drugs        Review of Systems   Constitutional:  Positive for fever and malaise/fatigue.   HENT:  Positive for congestion and sore throat.    Respiratory:  Positive for cough, sputum production, shortness of breath and wheezing. Negative for hemoptysis.    Cardiovascular:  Negative for leg swelling.  "  Gastrointestinal:  Negative for diarrhea and vomiting.   Neurological:  Positive for headaches.   All other systems reviewed and are negative.       Objective:   BP 96/52 (BP Location: Right arm, Patient Position: Sitting, BP Cuff Size: Adult)   Pulse (!) 102   Temp 36.8 °C (98.3 °F) (Temporal)   Resp 18   Ht 1.549 m (5' 1\")   Wt 53.1 kg (117 lb)   SpO2 95%   BMI 22.11 kg/m²     Physical Exam  Vitals reviewed.   Constitutional:       General: She is not in acute distress.     Appearance: She is well-developed. She is ill-appearing. She is not toxic-appearing.   HENT:      Head: Normocephalic and atraumatic.      Right Ear: External ear normal.      Left Ear: External ear normal.      Nose: Mucosal edema present.      Mouth/Throat:      Lips: Pink.      Mouth: Mucous membranes are moist.      Pharynx: Posterior oropharyngeal erythema present.   Eyes:      Conjunctiva/sclera: Conjunctivae normal.   Cardiovascular:      Rate and Rhythm: Tachycardia present.   Pulmonary:      Effort: Accessory muscle usage present. No bradypnea or respiratory distress.      Breath sounds: Examination of the left-middle field reveals rhonchi. Wheezing and rhonchi present.      Comments: RR 26. Mild accessory muscle usage.   Musculoskeletal:      Cervical back: Neck supple.      Right lower leg: No edema.      Left lower leg: No edema.   Skin:     General: Skin is warm and dry.      Findings: No rash.   Neurological:      Mental Status: She is alert and oriented to person, place, and time.      GCS: GCS eye subscore is 4. GCS verbal subscore is 5. GCS motor subscore is 6.   Psychiatric:         Speech: Speech normal.         Behavior: Behavior normal.         Thought Content: Thought content normal.         Judgment: Judgment normal.         Assessment/Plan:   1. Pleural effusion on right  - DX-CHEST-2 VIEWS; Future    2. SOB (shortness of breath)  - POCT CEPHEID COV-2, FLU A/B, RSV - PCR  - albuterol (Proventil) 2.5mg/3ml " nebulizer solution 2.5 mg  - DX-CHEST-2 VIEWS; Future    3. Viral respiratory infection  - POCT CEPHEID COV-2, FLU A/B, RSV - PCR    DX-CHEST-2 VIEWS    Result Date: 10/4/2023  10/4/2023 11:16 AM HISTORY/REASON FOR EXAM:  Shortness of Breath TECHNIQUE/EXAM DESCRIPTION: PA and lateral views of the chest. COMPARISON:  None. FINDINGS: The lungs diffuse opacification of the right lung base. The cardiac silhouette is normal in size. There is wedge-shaped opacification in the right lung base on the lateral view. There is marked blunting right costophrenic angle. There are no significant osseous abnormalities. The visualized portions of the upper abdomen are within normal limits.     1.  Moderately large right pleural effusion.    Results for orders placed or performed in visit on 10/04/23   POCT CEPHEID COV-2, FLU A/B, RSV - PCR   Result Value Ref Range    SARS-CoV-2 by PCR Negative Negative, Invalid    Influenza virus A RNA Negative Negative, Invalid    Influenza virus B, PCR Negative Negative, Invalid    RSV, PCR Negative Negative, Invalid     Moderate pleural effusion. Discussed emergent follow up for further evaluation. Stable oxygenation. Pt's spouse is present. Opted to go POV. Transport center was called.     Differential diagnosis, natural history, supportive care, and indications for immediate follow-up discussed.

## 2023-10-04 NOTE — ASSESSMENT & PLAN NOTE
4 minutes spent on tobacco cessation.  She smokes a pack of cigarettes a day for the last 50 years.  She was offered nicotine replacement therapy, such as nicotine patch/chewing gum/etc.  At this time she declines.

## 2023-10-04 NOTE — ASSESSMENT & PLAN NOTE
Due to COPD exacerbation and pleural effusion  Not on home oxygen.  Now on 2 L    I ordered a proBNP, Echo, TSH  Wean down oxygen

## 2023-10-05 ENCOUNTER — APPOINTMENT (OUTPATIENT)
Dept: RADIOLOGY | Facility: MEDICAL CENTER | Age: 68
DRG: 193 | End: 2023-10-05
Attending: STUDENT IN AN ORGANIZED HEALTH CARE EDUCATION/TRAINING PROGRAM
Payer: MEDICARE

## 2023-10-05 ENCOUNTER — APPOINTMENT (OUTPATIENT)
Dept: CARDIOLOGY | Facility: MEDICAL CENTER | Age: 68
DRG: 193 | End: 2023-10-05
Attending: STUDENT IN AN ORGANIZED HEALTH CARE EDUCATION/TRAINING PROGRAM
Payer: MEDICARE

## 2023-10-05 PROBLEM — J90 PLEURAL EFFUSION: Status: ACTIVE | Noted: 2023-10-05

## 2023-10-05 LAB
AMYLASE FLD-CCNC: 35 U/L
ANION GAP SERPL CALC-SCNC: 12 MMOL/L (ref 7–16)
APPEARANCE FLD: NORMAL
BODY FLD TYPE: NORMAL
BUN SERPL-MCNC: 9 MG/DL (ref 8–22)
CALCIUM SERPL-MCNC: 9.1 MG/DL (ref 8.5–10.5)
CHLORIDE SERPL-SCNC: 93 MMOL/L (ref 96–112)
CO2 SERPL-SCNC: 23 MMOL/L (ref 20–33)
COLOR FLD: YELLOW
CREAT SERPL-MCNC: 0.45 MG/DL (ref 0.5–1.4)
CYTOLOGY REG CYTOL: NORMAL
ERYTHROCYTE [DISTWIDTH] IN BLOOD BY AUTOMATED COUNT: 44.3 FL (ref 35.9–50)
FUNGUS SPEC FUNGUS STN: NORMAL
GFR SERPLBLD CREATININE-BSD FMLA CKD-EPI: 105 ML/MIN/1.73 M 2
GLUCOSE FLD-MCNC: 75 MG/DL
GLUCOSE SERPL-MCNC: 143 MG/DL (ref 65–99)
GRAM STN SPEC: NORMAL
HCT VFR BLD AUTO: 39.5 % (ref 37–47)
HGB BLD-MCNC: 14 G/DL (ref 12–16)
LV EJECT FRACT  99904: 65
LV EJECT FRACT MOD 2C 99903: 73.43
LV EJECT FRACT MOD 4C 99902: 60.89
LV EJECT FRACT MOD BP 99901: 67.7
LYMPHOCYTES NFR FLD: 23 %
MCH RBC QN AUTO: 33.7 PG (ref 27–33)
MCHC RBC AUTO-ENTMCNC: 35.4 G/DL (ref 32.2–35.5)
MCV RBC AUTO: 95 FL (ref 81.4–97.8)
MONOS+MACROS NFR FLD MANUAL: 47 %
NEUTROPHILS NFR FLD: 30 %
NT-PROBNP SERPL IA-MCNC: 597 PG/ML (ref 0–125)
NUC CELL # FLD: 149 CELLS/UL
PH FLD: 8 [PH]
PLATELET # BLD AUTO: 284 K/UL (ref 164–446)
PMV BLD AUTO: 9.2 FL (ref 9–12.9)
POTASSIUM SERPL-SCNC: 3.7 MMOL/L (ref 3.6–5.5)
PROT FLD-MCNC: 4.3 G/DL
RBC # BLD AUTO: 4.16 M/UL (ref 4.2–5.4)
RBC # FLD: NORMAL CELLS/UL
SIGNIFICANT IND 70042: NORMAL
SIGNIFICANT IND 70042: NORMAL
SITE SITE: NORMAL
SITE SITE: NORMAL
SODIUM SERPL-SCNC: 128 MMOL/L (ref 135–145)
SOURCE SOURCE: NORMAL
SOURCE SOURCE: NORMAL
WBC # BLD AUTO: 10.1 K/UL (ref 4.8–10.8)

## 2023-10-05 PROCEDURE — 88112 CYTOPATH CELL ENHANCE TECH: CPT

## 2023-10-05 PROCEDURE — 0W993ZX DRAINAGE OF RIGHT PLEURAL CAVITY, PERCUTANEOUS APPROACH, DIAGNOSTIC: ICD-10-PCS | Performed by: STUDENT IN AN ORGANIZED HEALTH CARE EDUCATION/TRAINING PROGRAM

## 2023-10-05 PROCEDURE — 83986 ASSAY PH BODY FLUID NOS: CPT

## 2023-10-05 PROCEDURE — 87206 SMEAR FLUORESCENT/ACID STAI: CPT

## 2023-10-05 PROCEDURE — 87070 CULTURE OTHR SPECIMN AEROBIC: CPT

## 2023-10-05 PROCEDURE — 94669 MECHANICAL CHEST WALL OSCILL: CPT

## 2023-10-05 PROCEDURE — 71045 X-RAY EXAM CHEST 1 VIEW: CPT

## 2023-10-05 PROCEDURE — 83880 ASSAY OF NATRIURETIC PEPTIDE: CPT

## 2023-10-05 PROCEDURE — 99407 BEHAV CHNG SMOKING > 10 MIN: CPT

## 2023-10-05 PROCEDURE — 84157 ASSAY OF PROTEIN OTHER: CPT

## 2023-10-05 PROCEDURE — 80048 BASIC METABOLIC PNL TOTAL CA: CPT

## 2023-10-05 PROCEDURE — 700111 HCHG RX REV CODE 636 W/ 250 OVERRIDE (IP): Mod: JZ | Performed by: STUDENT IN AN ORGANIZED HEALTH CARE EDUCATION/TRAINING PROGRAM

## 2023-10-05 PROCEDURE — 770020 HCHG ROOM/CARE - TELE (206)

## 2023-10-05 PROCEDURE — C1729 CATH, DRAINAGE: HCPCS

## 2023-10-05 PROCEDURE — 82945 GLUCOSE OTHER FLUID: CPT

## 2023-10-05 PROCEDURE — 94640 AIRWAY INHALATION TREATMENT: CPT

## 2023-10-05 PROCEDURE — 87205 SMEAR GRAM STAIN: CPT | Mod: 91

## 2023-10-05 PROCEDURE — 83615 LACTATE (LD) (LDH) ENZYME: CPT

## 2023-10-05 PROCEDURE — 36415 COLL VENOUS BLD VENIPUNCTURE: CPT

## 2023-10-05 PROCEDURE — 93306 TTE W/DOPPLER COMPLETE: CPT | Mod: 26 | Performed by: INTERNAL MEDICINE

## 2023-10-05 PROCEDURE — 87116 MYCOBACTERIA CULTURE: CPT

## 2023-10-05 PROCEDURE — 99233 SBSQ HOSP IP/OBS HIGH 50: CPT | Performed by: STUDENT IN AN ORGANIZED HEALTH CARE EDUCATION/TRAINING PROGRAM

## 2023-10-05 PROCEDURE — 88305 TISSUE EXAM BY PATHOLOGIST: CPT

## 2023-10-05 PROCEDURE — 93306 TTE W/DOPPLER COMPLETE: CPT

## 2023-10-05 PROCEDURE — 87015 SPECIMEN INFECT AGNT CONCNTJ: CPT | Mod: 91

## 2023-10-05 PROCEDURE — 87102 FUNGUS ISOLATION CULTURE: CPT

## 2023-10-05 PROCEDURE — 82150 ASSAY OF AMYLASE: CPT

## 2023-10-05 PROCEDURE — 700101 HCHG RX REV CODE 250: Performed by: STUDENT IN AN ORGANIZED HEALTH CARE EDUCATION/TRAINING PROGRAM

## 2023-10-05 PROCEDURE — 85027 COMPLETE CBC AUTOMATED: CPT

## 2023-10-05 PROCEDURE — 89051 BODY FLUID CELL COUNT: CPT

## 2023-10-05 PROCEDURE — 94664 DEMO&/EVAL PT USE INHALER: CPT

## 2023-10-05 RX ORDER — PREDNISONE 20 MG/1
40 TABLET ORAL DAILY
Status: DISCONTINUED | OUTPATIENT
Start: 2023-10-06 | End: 2023-10-06 | Stop reason: HOSPADM

## 2023-10-05 RX ORDER — IPRATROPIUM BROMIDE AND ALBUTEROL SULFATE 2.5; .5 MG/3ML; MG/3ML
3 SOLUTION RESPIRATORY (INHALATION)
Status: DISCONTINUED | OUTPATIENT
Start: 2023-10-05 | End: 2023-10-06 | Stop reason: HOSPADM

## 2023-10-05 RX ADMIN — IPRATROPIUM BROMIDE AND ALBUTEROL SULFATE 3 ML: 2.5; .5 SOLUTION RESPIRATORY (INHALATION) at 16:35

## 2023-10-05 RX ADMIN — IPRATROPIUM BROMIDE AND ALBUTEROL SULFATE 3 ML: 2.5; .5 SOLUTION RESPIRATORY (INHALATION) at 11:19

## 2023-10-05 RX ADMIN — METHYLPREDNISOLONE SODIUM SUCCINATE 40 MG: 40 INJECTION, POWDER, FOR SOLUTION INTRAMUSCULAR; INTRAVENOUS at 13:49

## 2023-10-05 RX ADMIN — METHYLPREDNISOLONE SODIUM SUCCINATE 40 MG: 40 INJECTION, POWDER, FOR SOLUTION INTRAMUSCULAR; INTRAVENOUS at 05:14

## 2023-10-05 RX ADMIN — IPRATROPIUM BROMIDE AND ALBUTEROL SULFATE 3 ML: 2.5; .5 SOLUTION RESPIRATORY (INHALATION) at 03:13

## 2023-10-05 ASSESSMENT — COGNITIVE AND FUNCTIONAL STATUS - GENERAL
SUGGESTED CMS G CODE MODIFIER MOBILITY: CI
SUGGESTED CMS G CODE MODIFIER DAILY ACTIVITY: CH
MOBILITY SCORE: 23
CLIMB 3 TO 5 STEPS WITH RAILING: A LITTLE
DAILY ACTIVITIY SCORE: 24

## 2023-10-05 ASSESSMENT — COPD QUESTIONNAIRES
HAVE YOU SMOKED AT LEAST 100 CIGARETTES IN YOUR ENTIRE LIFE: YES
DURING THE PAST 4 WEEKS HOW MUCH DID YOU FEEL SHORT OF BREATH: SOME OF THE TIME
COPD SCREENING SCORE: 5
DO YOU EVER COUGH UP ANY MUCUS OR PHLEGM?: NO/ONLY WITH OCCASIONAL COLDS OR INFECTIONS

## 2023-10-05 ASSESSMENT — FIBROSIS 4 INDEX: FIB4 SCORE: 1.23

## 2023-10-05 NOTE — ASSESSMENT & PLAN NOTE
Patient noted to have mild diffuse inspiratory and expiratory wheezing.  Patient has a 50 pack smoking history and likely has undiagnosed COPD.      Continue steroid for 5 days, I changed the IV Solu-Medrol to prednisone 40 mg daily

## 2023-10-05 NOTE — PROGRESS NOTES
Monitor Summary  Rhythm: Normal Sinus Rhythm - Sinus Tach  Rate:   Ectopy: N/A  .15 / .08 / .36

## 2023-10-05 NOTE — PROGRESS NOTES
Assumed care of patient. A&Ox4 anxious but able to calm down with redirection. Updated patient on plan of care and updated white board. All needs met at this time and fall precautions in place. Will continue to monitor.

## 2023-10-05 NOTE — RESPIRATORY CARE
"COPD EDUCATION by COPD CLINICAL EDUCATOR  10/5/2023  at  11:38 AM by Isabel Toth, RRT     Patient interviewed by COPD education team, spouse is at bedside and very supportive and informative about COPD himself.  Patient unable to participate in full program.  A short intervention has been conducted.  A comprehensive packet including information about COPD, types of treatments to manage their disease and safe home Oxygen usage was provided and reviewed with patient at the bedside.  Patient given a spacer and flutter with instruction.      Smoking Cessation Intervention and education completed, 20 minutes spent on smoking cessation education with patient. Patient has spouse who is very supportive, quit smoking himself about 10 years ago.     Provided smoking cessation packet with \"Tips to Quit\" and brochure for \"Free Smoking Cessation Classes\".  Patient is very motivated to quit.         COPD Screen  COPD Risk Screening  Do you have a history of COPD?: No  COPD Population Screener  During the past 4 weeks, how much did you feel short of breath?: Some of the time  Do you ever cough up any mucus or phlegm?: No/only with occasional colds or infections  In the past 12 months, you do less than you used to because of your breathing problems: Disagree/unsure  Have you smoked at least 100 cigarettes in your entire life?: Yes  How old are you?: 60+  COPD Screening Score: 5  COPD Coordinator Recommended: Yes    COPD Assessment  COPD Clinical Specialists ONLY  COPD Education Initiated: Yes--Short Intervention (Given COPD and Smoking Cessation literature, spacer, flutter, and [employer] info.  at bedside, very supportive, recent grad from [facility]m [facility])  Is this a COPD exacerbation patient?: Yes (per H&P, order set used, IP Pulmonary notified)  DME Company: none prior  DME Equipment Type: none prior  Physician Name: none - declined assistance in getting apt  Pulmonologist Name: [facility] to consult, will request f/u " "apt  Referrals Initiated: Yes  Pulmonary Rehab: N/A  Smoking Cessation: Yes  $ Smoking Cessation >10 Minutes: Symptomatic (20)  Hospice: N/A  Home Health Care: N/A  Mobile Urgent Care Services: Declined (given info)  [facility]: N/A  Private In-Home Care Agency: N/A  $ Demo/Eval of SVN's, MDI's and Aerosols: Yes (spacer)  (OP) Pulmonary Function Testing:  (none on file)  Interdisciplinary Rounds: Attendance at Rounds (30 Min)    PFT Results    No results found for: \"PFT\"    Meds to Beds  Would the patient like to opt in for Bedside Medication Delivery at Discharge?: No     MY COPD ACTION PLAN     It is recommended that patients and physicians /healthcare providers complete this action plan together. This plan should be discussed at each physician visit and updated as needed.    The green, yellow and red zones show groups of symptoms of COPD. This list of symptoms is not comprehensive, and you may experience other symptoms. In the \"Actions\" column, your healthcare provider has recommended actions for you to take based on your symptoms.    Patient Name: Cyndee Mosley   YOB: 1955   Last Updated on:     Green Zone:  I am doing well today Actions     Usual activitiy and exercise level   Take daily medications     Usual amounts of cough and phlegm/mucus   Use oxygen as prescribed     Sleep well at night   Continue regular exercise/diet plan     Appetite is good   At all times avoid cigarette smoke, inhaled irritants     Daily Medications (these medications are taken every day):                Yellow Zone:  I am having a bad day or a COPD flare Actions     More breathless than usual   Continue daily medications     I have less energy for my daily activities   Use quick relief inhaler as ordered     Increased or thicker phlegm/mucus   Use oxygen as prescribed     Using quick relief inhaler/nebulizer more often   Get plenty of rest     Swelling of ankles more than usual   Use pursed lip " "breathing     More coughing than usual   At all times avoid cigarette smoke, inhaled irritants     I feel like I have a \"chest cold\"     Poor sleep and my symptoms woke me up     My appetite is not good     My medicine is not helping      Call provider immediately if symptoms don’t improve     Continue daily medications, add rescue medications:   Albuterol 2 Puffs Every 4 hours PRN       Medications to be used during a flare up, (as Discussed with Provider):           Additional Information:  Use the spacer with your rescue inhaler    Red Zone:  I need urgent medical care Actions     Severe shortness of breath even at rest   Call 911 or seek medical care immediately     Not able to do any activity because of breathing      Fever or shaking chills      Feeling confused or very drowsy       Chest pains      Coughing up blood                  "

## 2023-10-05 NOTE — ED NOTES
Pt transported now to  [facility] with ACLS tech on monitor. Pt AOx4, GCS15, VSS. Pt on O2 for transport

## 2023-10-05 NOTE — ASSESSMENT & PLAN NOTE
16 minutes spent discussing goals of care with patient and her  at bedside.  She was explained her clinical diagnosis and treatment plan moving forward.  She states that she like to be full code and have everything done, including chest compressions and intubation

## 2023-10-05 NOTE — PROGRESS NOTES
Assumed care of patient after receiving report from Evelyn day shift RN. Patient is currently Alert and Oriented x4 on 2L NC. Bed locked and in lowest position. Call light within reach.

## 2023-10-05 NOTE — CONSULTS
Pulmonary Consultation    Date of consult: 10/5/2023    Referring Physician  Tangela Akbar M.D.    Reason for Consultation  COPD auto consult    History of Presenting Illness  68 y.o. female who presented 10/4/2023 with cough, shortness of breath, congestion over the last week, which had worsened on Tuesday.  She is a smoker, has smoked for approximately 50 years about 1 pack/day.  She quit on Wednesday morning.  She does not use oxygen at home but was requiring 2 L nasal cannula when she was admitted.  She is now down to room air.  CT scan shows mild emphysema and a large right pleural effusion.  Patient had the pleural effusion drained on 10/5/2023.  Fluid studies are consistent with an exudate, but no signs of empyema.  Patient was also started on Solu-Medrol and around-the-clock nebulizers and has since been transitioned to prednisone.  She was started on Unasyn and azithromycin on day 1, which was discontinued on day 2.    Code Status  Full Code    Review of Systems   Constitutional:  Positive for fever and malaise/fatigue. Negative for chills.   Respiratory:  Positive for cough, sputum production, shortness of breath and wheezing.    Cardiovascular:  Negative for chest pain and leg swelling.   Gastrointestinal:  Negative for nausea and vomiting.   Musculoskeletal:  Negative for myalgias.   Neurological:  Positive for weakness. Negative for dizziness.     Past Medical History  Tobacco use    Surgical History   has no past surgical history on file.    Family History  Denies family history of lung disease    Social History   Smoked 50 for 50 years approximately 1 pack/day.  Reports that she quit prior to arriving to the hospital and intends to not smoke after she leaves    Medications  Home Medications       Reviewed by Bridgette Crane (Pharmacy Tech) on 10/04/23 at 1441  Med List Status: Complete     Medication Last Dose Status   albuterol 108 (90 Base) MCG/ACT Aero Soln inhalation aerosol 10/3/2023 Active    Ascorbic Acid (VITAMIN C PO) 10/3/2023 Active   B Complex Vitamins (VITAMIN B COMPLEX) Tab 10/3/2023 Active   Cholecalciferol (VITAMIN D3 PO) 10/3/2023 Active   Misc Natural Products (PUMPKIN SEED OIL PO) 10/3/2023 Active   Multiple Vitamins-Minerals (AIRBORNE GUMMIES PO) 10/3/2023 Active   Multiple Vitamins-Minerals (HAIR SKIN AND NAILS FORMULA) Tab 10/3/2023 Active   Omega-3 Fatty Acids (FISH OIL PO) 10/3/2023 Active   Phenyleph-Doxylamine-DM-APAP (JEANNINE-SELTZER PLS ALLERGY & CGH) 5-6.25- MG Cap 10/3/2023 Active   Prenatal Vit-Fe Fumarate-FA (PRENATAL PO) 10/3/2023 Active   pseudoephedrine (SUDAFED) 30 MG Tab 10/3/2023 Active                  Current Facility-Administered Medications   Medication Dose Route Frequency Provider Last Rate Last Admin    acetaminophen (Tylenol) tablet 650 mg  650 mg Oral Q6HRS PRN Brian Philippe M.D.        ipratropium-albuterol (DUONEB) nebulizer solution  3 mL Nebulization Q4HRS (RT) Brian Philippe M.D.   3 mL at 10/05/23 1119    methylPREDNISolone (SOLU-MEDROL) 40 MG injection 40 mg  40 mg Intravenous Q8HRS Brian Philippe M.D.   40 mg at 10/05/23 1349       Allergies  Allergies   Allergen Reactions    Sulfa Drugs Rash     Rash         Vital Signs last 24 hours  Temp:  [36 °C (96.8 °F)-36.6 °C (97.9 °F)] 36 °C (96.8 °F)  Pulse:  [] 94  Resp:  [16-20] 18  BP: (102-141)/(56-80) 120/70  SpO2:  [92 %-99 %] 94 %    Physical Exam  Vitals and nursing note reviewed.   Constitutional:       Appearance: Normal appearance.   HENT:      Head: Normocephalic.   Eyes:      Conjunctiva/sclera: Conjunctivae normal.   Cardiovascular:      Rate and Rhythm: Normal rate and regular rhythm.   Pulmonary:      Effort: Pulmonary effort is normal.      Breath sounds: Normal breath sounds.   Abdominal:      Palpations: Abdomen is soft.   Skin:     General: Skin is warm and dry.   Neurological:      General: No focal deficit present.      Mental Status: She is alert and oriented to  person, place, and time.   Psychiatric:         Mood and Affect: Mood normal.     Fluids    Intake/Output Summary (Last 24 hours) at 10/5/2023 1427  Last data filed at 10/4/2023 1710  Gross per 24 hour   Intake 350 ml   Output --   Net 350 ml       Laboratory  Recent Results (from the past 48 hour(s))   POCT CEPHEID COV-2, FLU A/B, RSV - PCR    Collection Time: 10/04/23 11:43 AM   Result Value Ref Range    SARS-CoV-2 by PCR Negative Negative, Invalid    Influenza virus A RNA Negative Negative, Invalid    Influenza virus B, PCR Negative Negative, Invalid    RSV, PCR Negative Negative, Invalid   Lactic acid (lactate)    Collection Time: 10/04/23 12:26 PM   Result Value Ref Range    Lactic Acid 1.6 0.5 - 2.0 mmol/L   CBC With Differential    Collection Time: 10/04/23 12:26 PM   Result Value Ref Range    WBC 14.4 (H) 4.8 - 10.8 K/uL    RBC 4.71 4.20 - 5.40 M/uL    Hemoglobin 15.3 12.0 - 16.0 g/dL    Hematocrit 44.0 37.0 - 47.0 %    MCV 93.4 81.4 - 97.8 fL    MCH 32.5 27.0 - 33.0 pg    MCHC 34.8 32.2 - 35.5 g/dL    RDW 43.4 35.9 - 50.0 fL    Platelet Count 311 164 - 446 K/uL    MPV 9.1 9.0 - 12.9 fL    Neutrophils-Polys 83.10 (H) 44.00 - 72.00 %    Lymphocytes 7.00 (L) 22.00 - 41.00 %    Monocytes 9.20 0.00 - 13.40 %    Eosinophils 0.00 0.00 - 6.90 %    Basophils 0.30 0.00 - 1.80 %    Immature Granulocytes 0.40 0.00 - 0.90 %    Nucleated RBC 0.00 0.00 - 0.20 /100 WBC    Neutrophils (Absolute) 11.93 (H) 1.82 - 7.42 K/uL    Lymphs (Absolute) 1.01 1.00 - 4.80 K/uL    Monos (Absolute) 1.32 (H) 0.00 - 0.85 K/uL    Eos (Absolute) 0.00 0.00 - 0.51 K/uL    Baso (Absolute) 0.05 0.00 - 0.12 K/uL    Immature Granulocytes (abs) 0.06 0.00 - 0.11 K/uL    NRBC (Absolute) 0.00 K/uL   Comp Metabolic Panel    Collection Time: 10/04/23 12:26 PM   Result Value Ref Range    Sodium 128 (L) 135 - 145 mmol/L    Potassium 4.0 3.6 - 5.5 mmol/L    Chloride 91 (L) 96 - 112 mmol/L    Co2 23 20 - 33 mmol/L    Anion Gap 14.0 7.0 - 16.0    Glucose 112  (H) 65 - 99 mg/dL    Bun 7 (L) 8 - 22 mg/dL    Creatinine 0.47 (L) 0.50 - 1.40 mg/dL    Calcium 9.5 8.5 - 10.5 mg/dL    Correct Calcium 8.9 8.5 - 10.5 mg/dL    AST(SGOT) 21 12 - 45 U/L    ALT(SGPT) 14 2 - 50 U/L    Alkaline Phosphatase 86 30 - 99 U/L    Total Bilirubin 0.3 0.1 - 1.5 mg/dL    Albumin 4.7 3.2 - 4.9 g/dL    Total Protein 7.5 6.0 - 8.2 g/dL    Globulin 2.8 1.9 - 3.5 g/dL    A-G Ratio 1.7 g/dL   Blood Culture - Draw one set from central line if present    Collection Time: 10/04/23 12:26 PM    Specimen: Peripheral; Blood   Result Value Ref Range    Significant Indicator NEG     Source BLD     Site PERIPHERAL     Culture Result       No Growth  Note: Blood cultures are incubated for 5 days and  are monitored continuously.Positive blood cultures  are called to the RN and reported as soon as  they are identified.     ESTIMATED GFR    Collection Time: 10/04/23 12:26 PM   Result Value Ref Range    GFR (CKD-EPI) 104 >60 mL/min/1.73 m 2   PROCALCITONIN    Collection Time: 10/04/23 12:26 PM   Result Value Ref Range    Procalcitonin 0.07 <0.25 ng/mL   Blood Culture - Draw one set from central line if present    Collection Time: 10/04/23  1:33 PM    Specimen: Line; Blood   Result Value Ref Range    Significant Indicator NEG     Source BLD     Site Peripheral     Culture Result       No Growth  Note: Blood cultures are incubated for 5 days and  are monitored continuously.Positive blood cultures  are called to the RN and reported as soon as  they are identified.     Urinalysis    Collection Time: 10/04/23  3:34 PM    Specimen: Urine   Result Value Ref Range    Color Yellow     Character Clear     Specific Gravity 1.015 <1.035    Ph 6.5 5.0 - 8.0    Glucose Negative Negative mg/dL    Ketones 40 (A) Negative mg/dL    Protein 30 (A) Negative mg/dL    Bilirubin Negative Negative    Urobilinogen, Urine 0.2 Negative    Nitrite Negative Negative    Leukocyte Esterase Negative Negative    Occult Blood Moderate (A) Negative     Micro Urine Req Microscopic    Urine Culture (New)    Collection Time: 10/04/23  3:34 PM    Specimen: Urine   Result Value Ref Range    Significant Indicator NEG     Source UR     Site -     Culture Result No growth at 24 hours.    URINE MICROSCOPIC (W/UA)    Collection Time: 10/04/23  3:34 PM   Result Value Ref Range    WBC 2-5 /hpf    RBC 20-50 (A) /hpf    Bacteria Few (A) None /hpf    Epithelial Cells Few /hpf    Epithelial Cells Renal Rare /hpf    Hyaline Cast 0-2 /lpf   PT/INR    Collection Time: 10/04/23  4:33 PM   Result Value Ref Range    PT 14.0 12.0 - 14.6 sec    INR 1.07 0.87 - 1.13   PTT    Collection Time: 10/04/23  4:33 PM   Result Value Ref Range    APTT 28.6 24.7 - 36.0 sec   Basic Metabolic Panel (BMP)    Collection Time: 10/05/23 12:33 AM   Result Value Ref Range    Sodium 128 (L) 135 - 145 mmol/L    Potassium 3.7 3.6 - 5.5 mmol/L    Chloride 93 (L) 96 - 112 mmol/L    Co2 23 20 - 33 mmol/L    Glucose 143 (H) 65 - 99 mg/dL    Bun 9 8 - 22 mg/dL    Creatinine 0.45 (L) 0.50 - 1.40 mg/dL    Calcium 9.1 8.5 - 10.5 mg/dL    Anion Gap 12.0 7.0 - 16.0   ESTIMATED GFR    Collection Time: 10/05/23 12:33 AM   Result Value Ref Range    GFR (CKD-EPI) 105 >60 mL/min/1.73 m 2   proBrain Natriuretic Peptide, NT    Collection Time: 10/05/23  8:16 AM   Result Value Ref Range    NT-proBNP 597 (H) 0 - 125 pg/mL   CBC WITHOUT DIFFERENTIAL    Collection Time: 10/05/23  8:16 AM   Result Value Ref Range    WBC 10.1 4.8 - 10.8 K/uL    RBC 4.16 (L) 4.20 - 5.40 M/uL    Hemoglobin 14.0 12.0 - 16.0 g/dL    Hematocrit 39.5 37.0 - 47.0 %    MCV 95.0 81.4 - 97.8 fL    MCH 33.7 (H) 27.0 - 33.0 pg    MCHC 35.4 32.2 - 35.5 g/dL    RDW 44.3 35.9 - 50.0 fL    Platelet Count 284 164 - 446 K/uL    MPV 9.2 9.0 - 12.9 fL   EC-ECHOCARDIOGRAM COMPLETE W/O CONT    Collection Time: 10/05/23 10:57 AM   Result Value Ref Range    Eject.Frac. MOD BP 67.7     Eject.Frac. MOD 4C 60.89     Eject.Frac. MOD 2C 73.43     Left Ventrical Ejection  Fraction 65        Imaging  US-THORACENTESIS PUNCTURE RIGHT   Final Result      1. Ultrasound guided right sided diagnostic thoracentesis.      2. 450 mL of fluid withdrawn..      DX-CHEST-PORTABLE (1 VIEW)   Final Result      1.  RIGHT lung base consolidation and pleural fluid again seen.   2.  No pneumothorax.      EC-ECHOCARDIOGRAM COMPLETE W/O CONT   Final Result      CT-CTA CHEST PULMONARY ARTERY W/ RECONS   Final Result      1.  No CT evidence of pulmonary emboli.      2.  Moderately large right pleural effusion.      3.  Partial collapse of the right lower lobe.            DX-CHEST-PORTABLE (1 VIEW)    (Results Pending)       Assessment/Plan  #Exudative right pleural effusion, likely uncomplicated parapneumonic effusion  #Community-acquired pneumonia  #Likely COPD, in mild exacerbation  #Acute hypoxemic respiratory failure, resolved    We will restart patient on antibiotics with Unasyn, patient may be discharged on Augmentin for 7 days  Continue prednisone for 5 days total  We will start patient on Spiriva  Outpatient follow-up has been requested and repeat CT scan has been ordered  Patient may follow-up in the clinic for regarding the rest of her work-up, from a pulmonary point of view    We will sign off. Please do not hesitate to contact us if we can be of any further assistance. I am most easily reached via [employer] secure messaging application.    Estephania Arora, DO   Pulmonary and Critical Care

## 2023-10-05 NOTE — PROGRESS NOTES
4 Eyes Skin Assessment Completed by JERRELL Melara and DICK Jackson.    Head WDL  Ears WDL  Nose WDL  Mouth WDL  Neck WDL  Breast/Chest WDL  Shoulder Blades WDL  Spine WDL  (R) Arm/Elbow/Hand WDL  (L) Arm/Elbow/Hand WDL  Abdomen WDL  Groin WDL  Scrotum/Coccyx/Buttocks WDL  (R) Leg WDL  (L) Leg WDL  (R) Heel/Foot/Toe WDL  (L) Heel/Foot/Toe WDL          Devices In Places Tele Box and Nasal Cannula      Interventions In Place Gray Ear Foams and Pillows    Possible Skin Injury No    Pictures Uploaded Into Epic N/A  Wound Consult Placed N/A  RN Wound Prevention Protocol Ordered No

## 2023-10-05 NOTE — CARE PLAN
The patient is Watcher - Medium risk of patient condition declining or worsening    Shift Goals  Clinical Goals: Monitor resp status  Patient Goals: Eat, Get better  Family Goals: Get healthy    Progress made toward(s) clinical / shift goals:      Problem: Knowledge Deficit - Standard  Goal: Patient and family/care givers will demonstrate understanding of plan of care, disease process/condition, diagnostic tests and medications  Outcome: Progressing  Note: Patient's whiteboard updated. All patient questions answered.     Problem: Impaired Gas Exchange  Goal: Patient will demonstrate improved ventilation and adequate oxygenation and participate in treatment regimen within the level of ability/situation.  Outcome: Progressing  Note: Patient's O2 saturation stable with 2L via NC.

## 2023-10-06 ENCOUNTER — APPOINTMENT (OUTPATIENT)
Dept: RADIOLOGY | Facility: MEDICAL CENTER | Age: 68
DRG: 193 | End: 2023-10-06
Attending: INTERNAL MEDICINE
Payer: MEDICARE

## 2023-10-06 ENCOUNTER — PHARMACY VISIT (OUTPATIENT)
Dept: PHARMACY | Facility: MEDICAL CENTER | Age: 68
End: 2023-10-06
Payer: COMMERCIAL

## 2023-10-06 VITALS
WEIGHT: 116.84 LBS | HEIGHT: 61 IN | OXYGEN SATURATION: 87 % | DIASTOLIC BLOOD PRESSURE: 84 MMHG | BODY MASS INDEX: 22.06 KG/M2 | SYSTOLIC BLOOD PRESSURE: 122 MMHG | HEART RATE: 85 BPM | TEMPERATURE: 97 F | RESPIRATION RATE: 17 BRPM

## 2023-10-06 DIAGNOSIS — J90 PLEURAL EFFUSION: ICD-10-CM

## 2023-10-06 LAB
ANION GAP SERPL CALC-SCNC: 9 MMOL/L (ref 7–16)
BACTERIA UR CULT: NORMAL
BUN SERPL-MCNC: 18 MG/DL (ref 8–22)
CALCIUM SERPL-MCNC: 9.5 MG/DL (ref 8.5–10.5)
CHLORIDE SERPL-SCNC: 95 MMOL/L (ref 96–112)
CO2 SERPL-SCNC: 25 MMOL/L (ref 20–33)
CREAT SERPL-MCNC: 0.54 MG/DL (ref 0.5–1.4)
ERYTHROCYTE [DISTWIDTH] IN BLOOD BY AUTOMATED COUNT: 45.1 FL (ref 35.9–50)
GFR SERPLBLD CREATININE-BSD FMLA CKD-EPI: 100 ML/MIN/1.73 M 2
GLUCOSE SERPL-MCNC: 119 MG/DL (ref 65–99)
HCT VFR BLD AUTO: 38.3 % (ref 37–47)
HGB BLD-MCNC: 13.3 G/DL (ref 12–16)
LDH FLD L TO P-CCNC: 857 U/L
LDH SERPL L TO P-CCNC: 305 U/L (ref 107–266)
MAGNESIUM SERPL-MCNC: 2.3 MG/DL (ref 1.5–2.5)
MCH RBC QN AUTO: 33.1 PG (ref 27–33)
MCHC RBC AUTO-ENTMCNC: 34.7 G/DL (ref 32.2–35.5)
MCV RBC AUTO: 95.3 FL (ref 81.4–97.8)
PHOSPHATE SERPL-MCNC: 2.8 MG/DL (ref 2.5–4.5)
PLATELET # BLD AUTO: 311 K/UL (ref 164–446)
PMV BLD AUTO: 9.4 FL (ref 9–12.9)
POTASSIUM SERPL-SCNC: 4 MMOL/L (ref 3.6–5.5)
PROCALCITONIN SERPL-MCNC: 0.08 NG/ML
RBC # BLD AUTO: 4.02 M/UL (ref 4.2–5.4)
RHODAMINE-AURAMINE STN SPEC: NORMAL
SIGNIFICANT IND 70042: NORMAL
SIGNIFICANT IND 70042: NORMAL
SITE SITE: NORMAL
SITE SITE: NORMAL
SODIUM SERPL-SCNC: 129 MMOL/L (ref 135–145)
SOURCE SOURCE: NORMAL
SOURCE SOURCE: NORMAL
TSH SERPL DL<=0.005 MIU/L-ACNC: 0.19 UIU/ML (ref 0.38–5.33)
WBC # BLD AUTO: 21.7 K/UL (ref 4.8–10.8)

## 2023-10-06 PROCEDURE — 71045 X-RAY EXAM CHEST 1 VIEW: CPT

## 2023-10-06 PROCEDURE — 84100 ASSAY OF PHOSPHORUS: CPT

## 2023-10-06 PROCEDURE — 99222 1ST HOSP IP/OBS MODERATE 55: CPT | Performed by: INTERNAL MEDICINE

## 2023-10-06 PROCEDURE — 83615 LACTATE (LD) (LDH) ENZYME: CPT

## 2023-10-06 PROCEDURE — 85027 COMPLETE CBC AUTOMATED: CPT

## 2023-10-06 PROCEDURE — 700111 HCHG RX REV CODE 636 W/ 250 OVERRIDE (IP): Mod: JZ | Performed by: INTERNAL MEDICINE

## 2023-10-06 PROCEDURE — 700111 HCHG RX REV CODE 636 W/ 250 OVERRIDE (IP): Performed by: STUDENT IN AN ORGANIZED HEALTH CARE EDUCATION/TRAINING PROGRAM

## 2023-10-06 PROCEDURE — 99239 HOSP IP/OBS DSCHRG MGMT >30: CPT | Performed by: STUDENT IN AN ORGANIZED HEALTH CARE EDUCATION/TRAINING PROGRAM

## 2023-10-06 PROCEDURE — RXMED WILLOW AMBULATORY MEDICATION CHARGE: Performed by: STUDENT IN AN ORGANIZED HEALTH CARE EDUCATION/TRAINING PROGRAM

## 2023-10-06 PROCEDURE — 84443 ASSAY THYROID STIM HORMONE: CPT

## 2023-10-06 PROCEDURE — 83735 ASSAY OF MAGNESIUM: CPT

## 2023-10-06 PROCEDURE — 36415 COLL VENOUS BLD VENIPUNCTURE: CPT

## 2023-10-06 PROCEDURE — 700105 HCHG RX REV CODE 258: Performed by: INTERNAL MEDICINE

## 2023-10-06 PROCEDURE — 80048 BASIC METABOLIC PNL TOTAL CA: CPT

## 2023-10-06 PROCEDURE — 84145 PROCALCITONIN (PCT): CPT

## 2023-10-06 RX ORDER — PREDNISONE 20 MG/1
20 TABLET ORAL DAILY
Qty: 3 TABLET | Refills: 0 | Status: SHIPPED | OUTPATIENT
Start: 2023-10-07 | End: 2023-10-10

## 2023-10-06 RX ORDER — AMOXICILLIN AND CLAVULANATE POTASSIUM 875; 125 MG/1; MG/1
1 TABLET, FILM COATED ORAL 2 TIMES DAILY
Qty: 14 TABLET | Refills: 0 | Status: ACTIVE | OUTPATIENT
Start: 2023-10-06 | End: 2023-10-13

## 2023-10-06 RX ADMIN — PREDNISONE 40 MG: 20 TABLET ORAL at 05:12

## 2023-10-06 RX ADMIN — AMPICILLIN AND SULBACTAM 3 G: 1; 2 INJECTION, POWDER, FOR SOLUTION INTRAMUSCULAR; INTRAVENOUS at 13:52

## 2023-10-06 ASSESSMENT — ENCOUNTER SYMPTOMS
VOMITING: 0
WEAKNESS: 1
SPUTUM PRODUCTION: 1
WHEEZING: 1
COUGH: 1
FEVER: 1
DIZZINESS: 0
MYALGIAS: 0
NAUSEA: 0
CHILLS: 0
SHORTNESS OF BREATH: 1

## 2023-10-06 ASSESSMENT — PAIN DESCRIPTION - PAIN TYPE: TYPE: ACUTE PAIN

## 2023-10-06 ASSESSMENT — FIBROSIS 4 INDEX: FIB4 SCORE: 1.23

## 2023-10-06 NOTE — PROGRESS NOTES
Assumed care of patient after receiving report from Saritha day shift RN. Patient is currently Alert and Oriented x4 on 2L NC. Bed locked and in lowest position. Call light within reach.

## 2023-10-06 NOTE — DISCHARGE SUMMARY
Discharge Summary    CHIEF COMPLAINT ON ADMISSION  Chief Complaint   Patient presents with    Shortness of Breath     x3days    Cough    Sent from Urgent Care       Reason for Admission  sob, sent by md     Admission Date  10/4/2023    CODE STATUS  Full Code    HPI & HOSPITAL COURSE  Cyndee Mosley is a 68 y.o. female with a history of 50-pack-year smoking history, presumed , who admitted 10/4/2023 with acute respiratory failure.  She was placed on 4 L NC.  CTPA showing negative pulmonary embolism, however moderately large right pleural effusion and partial collapse of the right lower lobe noted.  Patient underwent thoracentesis on 10/5, 450 cc was removed, consistent with exudates, culture negative so far.  Echo showed normal EF.  Normal renal function and liver function.  I discussed with pulmonary, considered most likely uncomplicated parapneumonic effusion.  Patient was started on Unasyn, transitioned to Augmentin to complete a 7 days course.  Patient has also been treated COPD exacerbation with steroids and inhalers.  Patient will follow-up with the pulmonary in 1 week., repeated CT ordered by pulmonary.   Patient requires 2 L with ambulation at discharge, home oxygen arranged  Hyponatremia, patient stated that she drink a lot of water .  She was advised to limit free water drink .  She was advised to repeat BMP with PCP in 1 week  Leukocytosis -due to pneumonia and steroids .    TSH 0.19 is low, no history of thyroid issues, could be related to acute stress.  Patient was advised to recheck TSH in a month  Smoke cessation education was given at bedside    Therefore, she is discharged in good and stable condition to home with close outpatient follow-up.    The patient met 2-midnight criteria for an inpatient stay at the time of discharge.    Discharge Date  10/6/2023    FOLLOW UP ITEMS POST DISCHARGE  - Follow up with primary care physician in 1 week.   - Follow up with pulmonary as instructed    -Your  sodium is a low, please limit free water drink, okay to increase sodium in diet. please recheck sodium in 1 week  -Your TSH is low, please recheck a TSH in 1 week    - Please take the medications as instructed    - Go to the local Emergency Department if you have any worsening condition.       DISCHARGE DIAGNOSES  Principal Problem:    Acute hypoxemic respiratory failure (HCC) (POA: Yes)  Active Problems:    Pleural effusion (POA: Unknown)    Tobacco abuse (POA: Unknown)    ACP (advance care planning) (POA: Unknown)    Acute exacerbation of chronic obstructive pulmonary disease (COPD) (HCC) (POA: Unknown)  Resolved Problems:    * No resolved hospital problems. *      FOLLOW UP  No future appointments.  Estephania Arora D.O.  [address] [facility] [id number] [phone]    Call in 1 week(s)  Please call your primary care provider to schedule, recent hospitalization follow up      MEDICATIONS ON DISCHARGE     Medication List        START taking these medications        Instructions   amoxicillin-clavulanate 875-125 MG Tabs  Commonly known as: Augmentin   Take 1 Tablet by mouth 2 times a day for 7 days.  Dose: 1 Tablet     predniSONE 20 MG Tabs  Start taking on: October 7, 2023  Commonly known as: Deltasone   Take 1 Tablet by mouth every day for 3 days.  Dose: 20 mg     tiotropium 2.5 mcg/Act Aers  Commonly known as: Spiriva Respimat   Inhale 2 Inhalations every day for 30 days.  Dose: 5 mcg            CONTINUE taking these medications        Instructions   * AIRBORNE GUMMIES PO   Take 1 Tablet by mouth every morning.  Dose: 1 Tablet     * Hair Skin and Nails Formula Tabs   Take 2 Tablets by mouth every morning.  Dose: 2 Tablet     albuterol 108 (90 Base) MCG/ACT Aers inhalation aerosol   Inhale 1-2 Puffs every 6 hours as needed for Shortness of Breath.  Dose: 1-2 Puff     Ramona-Jazmine Pls Allergy & Cgh 5-6.25- MG Caps  Generic drug: Phenyleph-Doxylamine-DM-APAP   Take 2 Tablets by mouth every four hours as  needed (cold).  Dose: 2 Tablet     FISH OIL PO   Take 1 Capsule by mouth every morning.  Dose: 1 Capsule     PRENATAL PO   Take 1 Tablet by mouth every morning.  Dose: 1 Tablet     pseudoephedrine 30 MG Tabs  Commonly known as: Sudafed   Take 60 mg by mouth every four hours as needed for Congestion.  Dose: 60 mg     PUMPKIN SEED OIL PO   Take 1 Tablet by mouth every morning.  Dose: 1 Tablet     Vitamin B Complex Tabs   Take 1 Tablet by mouth every morning.  Dose: 1 Tablet     VITAMIN C PO   Take 2 Tablets by mouth every morning.  Dose: 2 Tablet     VITAMIN D3 PO   Take 1 Tablet by mouth every morning.  Dose: 1 Tablet           * This list has 2 medication(s) that are the same as other medications prescribed for you. Read the directions carefully, and ask your doctor or other care provider to review them with you.                  Allergies  Allergies   Allergen Reactions    Sulfa Drugs Rash     Rash         DIET  Orders Placed This Encounter   Procedures    Diet Order Diet: Regular     Standing Status:   Standing     Number of Occurrences:   1     Order Specific Question:   Diet:     Answer:   Regular [1]       ACTIVITY  As tolerated.  Weight bearing as tolerated    CONSULTATIONS  Pulmonary    PROCEDURES  Thoracentesis    LABORATORY  Lab Results   Component Value Date    SODIUM 129 (L) 10/06/2023    POTASSIUM 4.0 10/06/2023    CHLORIDE 95 (L) 10/06/2023    CO2 25 10/06/2023    GLUCOSE 119 (H) 10/06/2023    BUN 18 10/06/2023    CREATININE 0.54 10/06/2023        Lab Results   Component Value Date    WBC 21.7 (H) 10/06/2023    HEMOGLOBIN 13.3 10/06/2023    HEMATOCRIT 38.3 10/06/2023    PLATELETCT 311 10/06/2023        Total time of the discharge process exceeds 34 minutes.

## 2023-10-06 NOTE — DISCHARGE INSTRUCTIONS
- Follow up with primary care physician in 1 week.   - Follow up with pulmonary as instructed    -Your sodium is a low, please limit free water drink, okay to increase sodium in diet. please recheck sodium in 1 week  -Your TSH is low, please recheck a TSH in 1 week    - Please take the medications as instructed    - Go to the local Emergency Department if you have any worsening condition.

## 2023-10-06 NOTE — DISCHARGE PLANNING
Received choice form at: [id number]  Agency/Facility name: [facility]  Referral sent per choice form at:  [id number]

## 2023-10-06 NOTE — DISCHARGE PLANNING
Case Management Discharge Planning    Admission Date: 10/4/2023  GMLOS: 3.4  ALOS: 2    6-Clicks ADL Score: 24  6-Clicks Mobility Score: 23      Anticipated Discharge Dispo: Discharge Disposition: Discharged to home/self care (01)    DME Needed: Pending walk test. Pending if pt needs home O2.    Action(s) Taken: Choice obtained LMSW received choice from pt for White O2 if needed. LMSW faxed choice to DPA to input in media until recommendations are in.     **HOME O2 NEEDED referral sent @[phone]    Escalations Completed: None    Medically Clear: No    Next Steps: Pending walk test results.    Barriers to Discharge: Medical clearance    Is the patient up for discharge tomorrow: No

## 2023-10-06 NOTE — PROGRESS NOTES
Hospital Medicine Daily Progress Note    Date of Service  10/5/2023    Chief Complaint  Cyndee Mosley is a 68 y.o. female admitted 10/4/2023 with dyspnea    Hospital Course  Cyndee Mosley is a 68 y.o. female who presented 10/4/2023 with shortness of breath.  Patient has a 50-pack-year smoking history and states that she is always short of breath.  She has never been checked for COPD in the past.  However for the last 3 days, she has reported progressively worsening shortness of breath and generalized weakness and chills.  She decided to come to ER for evaluation     In ER, patient tachycardic and hypoxic.  Found to have white blood cell count 14, sodium 128.  Procalcitonin negative.  COVID-negative.  CTPA showing negative pulmonary embolism, however moderately large right pleural effusion and partial collapse of the right lower lobe noted.    Interval Problem Update  2L    moderate right pleural effusion and partial collapse of the right lower lobe -I ordered a thoracentesis and the fluid analysis    COPD exacerbation -continue steroid  Sodium 128  WBC 14, on steroid  I ordered a proBNP, Echo, TSH      I have discussed this patient's plan of care and discharge plan at IDT rounds today with Case Management, Nursing, Nursing leadership, and other members of the IDT team.    Consultants/Specialty      Code Status  Full Code    Disposition  The patient is not medically cleared for discharge to home or a post-acute facility.      I have placed the appropriate orders for post-discharge needs.    Review of Systems  All 12 systems were reviewed and negative except as mentioned above       Physical Exam  Temp:  [36 °C (96.8 °F)-36.6 °C (97.9 °F)] 36.5 °C (97.7 °F)  Pulse:  [] 95  Resp:  [16-19] 18  BP: (102-135)/(52-70) 109/60  SpO2:  [93 %-100 %] 93 %    Physical Exam  Constitutional:       Appearance: She is ill-appearing.   HENT:      Head: Normocephalic and atraumatic.      Nose: Nose normal.       Mouth/Throat:      Mouth: Mucous membranes are moist.   Eyes:      Extraocular Movements: Extraocular movements intact.      Conjunctiva/sclera: Conjunctivae normal.   Cardiovascular:      Rate and Rhythm: Normal rate and regular rhythm.   Pulmonary:      Effort: Pulmonary effort is normal.      Breath sounds: Normal breath sounds. No wheezing or rales.      Comments: Decrease the breathing sounds  Abdominal:      General: Bowel sounds are normal.      Palpations: Abdomen is soft.      Tenderness: There is no abdominal tenderness. There is no guarding.   Musculoskeletal:         General: No swelling or tenderness. Normal range of motion.      Cervical back: Normal range of motion and neck supple.   Skin:     General: Skin is warm.   Neurological:      Mental Status: She is alert and oriented to person, place, and time. Mental status is at baseline.   Psychiatric:         Mood and Affect: Mood normal.         Fluids  No intake or output data in the 24 hours ending 10/05/23 1830    Laboratory  Recent Labs     10/04/23  1226 10/05/23  0816   WBC 14.4* 10.1   RBC 4.71 4.16*   HEMOGLOBIN 15.3 14.0   HEMATOCRIT 44.0 39.5   MCV 93.4 95.0   MCH 32.5 33.7*   MCHC 34.8 35.4   RDW 43.4 44.3   PLATELETCT 311 284   MPV 9.1 9.2     Recent Labs     10/04/23  1226 10/05/23  0033   SODIUM 128* 128*   POTASSIUM 4.0 3.7   CHLORIDE 91* 93*   CO2 23 23   GLUCOSE 112* 143*   BUN 7* 9   CREATININE 0.47* 0.45*   CALCIUM 9.5 9.1     Recent Labs     10/04/23  1633   APTT 28.6   INR 1.07               Imaging  US-THORACENTESIS PUNCTURE RIGHT   Final Result      1. Ultrasound guided right sided diagnostic thoracentesis.      2. 450 mL of fluid withdrawn..      DX-CHEST-PORTABLE (1 VIEW)   Final Result      1.  RIGHT lung base consolidation and pleural fluid again seen.   2.  No pneumothorax.      EC-ECHOCARDIOGRAM COMPLETE W/O CONT   Final Result      CT-CTA CHEST PULMONARY ARTERY W/ RECONS   Final Result      1.  No CT evidence of pulmonary  emboli.      2.  Moderately large right pleural effusion.      3.  Partial collapse of the right lower lobe.                 Assessment/Plan  * Acute hypoxemic respiratory failure (HCC)- (present on admission)  Assessment & Plan  Due to COPD exacerbation and pleural effusion  Not on home oxygen.  Now on 2 L    I ordered a proBNP, Echo, TSH  Wean down oxygen    Pleural effusion  Assessment & Plan  moderate right pleural effusion and partial collapse of the right lower lobe -I ordered a thoracentesis and the fluid analysis    Acute exacerbation of chronic obstructive pulmonary disease (COPD) (HCC)  Assessment & Plan  Patient noted to have mild diffuse inspiratory and expiratory wheezing.  Patient has a 50 pack smoking history and likely has undiagnosed COPD.      Continue steroid for 5 days, I changed the IV Solu-Medrol to prednisone 40 mg daily    ACP (advance care planning)  Assessment & Plan  16 minutes spent discussing goals of care with patient and her  at bedside.  She was explained her clinical diagnosis and treatment plan moving forward.  She states that she like to be full code and have everything done, including chest compressions and intubation    Tobacco abuse  Assessment & Plan  4 minutes spent on tobacco cessation.  She smokes a pack of cigarettes a day for the last 50 years.  She was offered nicotine replacement therapy, such as nicotine patch/chewing gum/etc.  At this time she declines.         VTE prophylaxis: Hold Lovenox for thoracentesis    I have performed a physical exam and reviewed and updated ROS and Plan today (10/5/2023). In review of yesterday's note (10/4/2023), there are no changes except as documented above.    I spent greater than 52 minutes for chart review, obtaining history independently, performing medically appropriate examination,  documenting , ordering medications, tests, or procedures, referring and communicating with other health care professionals, Independently  interpreting results and communicating results with patient/family/caregiver. More than 50% of time was spent in face-to-face clinical encounter.

## 2023-10-06 NOTE — ASSESSMENT & PLAN NOTE
moderate right pleural effusion and partial collapse of the right lower lobe -I ordered a thoracentesis and the fluid analysis

## 2023-10-06 NOTE — CARE PLAN
The patient is Watcher - Medium risk of patient condition declining or worsening    Shift Goals  Clinical Goals: Monitor Respitory status improvement  Patient Goals: D/C  Family Goals: Improvement    Progress made toward(s) clinical / shift goals:        Problem: Knowledge Deficit - Standard  Goal: Patient and family/care givers will demonstrate understanding of plan of care, disease process/condition, diagnostic tests and medications  Outcome: Progressing  Note: Updated patient on POC, patient verbalized understanding.      Problem: Knowledge Deficit - COPD  Goal: Patient/significant other demonstrates understanding of disease process, utilization of the Action Plan, medications and discharge instruction  Outcome: Progressing  Note: Discussed respiratory medication with patient. Reviewed smoking cessation benefits.

## 2023-10-06 NOTE — FACE TO FACE
"Face to Face Note  -  Durable Medical Equipment    Tangela Akbar M.D. - NPI: [id number]  I certify that this patient is under my care and that they had a durable medical equipment(DME)face to face encounter by myself that meets the physician DME face-to-face encounter requirements with this patient on:    Date of encounter:   Patient:                    MRN:                       YOB: 2023  Cyndee Mosley  [id number]  1955     The encounter with the patient was in whole, or in part, for the following medical condition, which is the primary reason for durable medical equipment:  Other - pleural effusion    I certify that, based on my findings, the following durable medical equipment is medically necessary:    Oxygen   HOME O2 Saturation Measurements:(Values must be present for Home Oxygen orders)  Room air sat at rest: 88  Room air sat with amb: 94  With liters of O2: 2, O2 sat at rest with O2: 90  With Liters of O2: 2, O2 sat with amb with O2 : 90  Is the patient mobile?: Yes  If patient feels more short of breath, they can go up to 6 liters per minute and contact healthcare provider.    Supporting Symptoms: The patient requires supplemental oxygen, as the following interventions have been tried with limited or no improvement: \"Ambulation with oximetry.    My Clinical findings support the need for the above equipment due to:  Hypoxia  "

## 2023-10-07 NOTE — PROGRESS NOTES
Patient being discharged. Pt educated on discharge instructions and new prescriptions, verbalized understanding. Follow up appointment to be made by patient in one week. PIV removed, monitor checked in. Patient going home via

## 2023-10-09 LAB
BACTERIA BLD CULT: NORMAL
BACTERIA BLD CULT: NORMAL
SIGNIFICANT IND 70042: NORMAL
SIGNIFICANT IND 70042: NORMAL
SITE SITE: NORMAL
SITE SITE: NORMAL
SOURCE SOURCE: NORMAL
SOURCE SOURCE: NORMAL

## 2023-10-10 LAB
BACTERIA FLD AEROBE CULT: NORMAL
GRAM STN SPEC: NORMAL
SIGNIFICANT IND 70042: NORMAL
SITE SITE: NORMAL
SOURCE SOURCE: NORMAL

## 2023-10-13 ENCOUNTER — OFFICE VISIT (OUTPATIENT)
Dept: MEDICAL GROUP | Facility: PHYSICIAN GROUP | Age: 68
End: 2023-10-13
Payer: MEDICARE

## 2023-10-13 VITALS
BODY MASS INDEX: 21.75 KG/M2 | DIASTOLIC BLOOD PRESSURE: 60 MMHG | TEMPERATURE: 97.4 F | SYSTOLIC BLOOD PRESSURE: 110 MMHG | HEIGHT: 61 IN | HEART RATE: 89 BPM | OXYGEN SATURATION: 91 % | WEIGHT: 115.2 LBS

## 2023-10-13 DIAGNOSIS — Z13.6 SCREENING FOR CARDIOVASCULAR CONDITION: ICD-10-CM

## 2023-10-13 DIAGNOSIS — J96.01 ACUTE HYPOXEMIC RESPIRATORY FAILURE (HCC): ICD-10-CM

## 2023-10-13 DIAGNOSIS — R79.89 LOW TSH LEVEL: ICD-10-CM

## 2023-10-13 DIAGNOSIS — R79.89 ABNORMAL TSH: ICD-10-CM

## 2023-10-13 DIAGNOSIS — E87.1 HYPONATREMIA: ICD-10-CM

## 2023-10-13 DIAGNOSIS — Z72.0 TOBACCO ABUSE: ICD-10-CM

## 2023-10-13 DIAGNOSIS — J90 PLEURAL EFFUSION: ICD-10-CM

## 2023-10-13 DIAGNOSIS — J44.1 ACUTE EXACERBATION OF CHRONIC OBSTRUCTIVE PULMONARY DISEASE (COPD) (HCC): ICD-10-CM

## 2023-10-13 PROCEDURE — 3078F DIAST BP <80 MM HG: CPT

## 2023-10-13 PROCEDURE — 3074F SYST BP LT 130 MM HG: CPT

## 2023-10-13 PROCEDURE — 99214 OFFICE O/P EST MOD 30 MIN: CPT

## 2023-10-13 ASSESSMENT — ENCOUNTER SYMPTOMS
WEIGHT LOSS: 0
WEAKNESS: 0
SHORTNESS OF BREATH: 0
CONSTIPATION: 0
FEVER: 0
MYALGIAS: 0
CHILLS: 0
NAUSEA: 0
VOMITING: 0
BLURRED VISION: 0
DIZZINESS: 0
DIARRHEA: 0
ABDOMINAL PAIN: 0
HEADACHES: 0
COUGH: 0

## 2023-10-13 ASSESSMENT — FIBROSIS 4 INDEX: FIB4 SCORE: 1.23

## 2023-10-13 ASSESSMENT — PATIENT HEALTH QUESTIONNAIRE - PHQ9: CLINICAL INTERPRETATION OF PHQ2 SCORE: 0

## 2023-10-13 NOTE — ASSESSMENT & PLAN NOTE
Chronic condition stable with recent exacerbation  -Recommend continuation of albuterol 108 mcg per attenuation 1 to 2 puffs every 6 hours if needed for bronchospasm  -Recommend continuation of Spiriva 5 mcg inhalation daily  -Continue to follow with pulmonary medicine as scheduled  -ED precautions given and known for worsening or progression of condition including any shortness of breath or chest pain

## 2023-10-13 NOTE — ASSESSMENT & PLAN NOTE
Acute condition appears to have resolved  - ED precautions given and known for worsening or progression or recurrence of shortness of breath or chest pain.

## 2023-10-13 NOTE — PROGRESS NOTES
Subjective:     CC:  Diagnoses of Tobacco abuse, Pleural effusion, Low TSH level, Hyponatremia, Screening for cardiovascular condition, Abnormal TSH, Acute exacerbation of chronic obstructive pulmonary disease (COPD) (HCC), and Acute hypoxemic respiratory failure (HCC) were pertinent to this visit.    HISTORY OF THE PRESENT ILLNESS: Patient is a 68 y.o. female. This pleasant patient is here today to establish care and discuss the following problems:    Problem   Low Tsh Level    Noted during hospitalization in 10/2023. Denies unintentional weight loss, palpitations, diarrhea/constipation, mood changes. Reports fatigue and dried out hair.   Latest Reference Range & Units 10/06/23 00:46   TSH 0.380 - 5.330 uIU/mL 0.190 (L)        Pleural Effusion    Noted during hospitalization in 10/2023. Workup negative and etiology unknown. Referred to pulmonology.     Acute Hypoxemic Respiratory Failure (Hcc)    Recent exacerbation of COPD with pneumonia.  Patient hospitalized.  She had pleural effusion which was drained.  CT lung did ashkan  Moderately large right pleural effusion. Partial collapse of the right lower lobe.  Reports to have over 5 L pulled off right lower lobe.        Tobacco Abuse    Recently quit smoking in 2023.      Acute Exacerbation of Chronic Obstructive Pulmonary Disease (Copd) (Hcc)    Chronic condition with recent exacerbation and hospitalized.  Patient diagnosed with pneumonia.  She had not been on inhalants prior to this hospitalization.  During her stay she was placed on albuterol 1 to 2 puffs every 6 hours if needed for bronchospasm and Spiriva 5 mcg daily.  She is doing well on these medications without reported side effects.  She is feeling much better.  Today O2 in the clinic 91%.  She does report to have a oxygen concentrator at home but has not been having to use it much.  Denies any shortness of breath or chest pain today.  Fortunately she has also quit smoking.         Health Maintenance:  "Declines today    ROS:   Review of Systems   Constitutional:  Negative for chills, fever, malaise/fatigue and weight loss.   Eyes:  Negative for blurred vision.   Respiratory:  Negative for cough and shortness of breath.    Cardiovascular:  Negative for chest pain.   Gastrointestinal:  Negative for abdominal pain, constipation, diarrhea, nausea and vomiting.   Musculoskeletal:  Negative for myalgias.   Neurological:  Negative for dizziness, weakness and headaches.         Objective:     Exam: /60 (BP Location: Left arm, Patient Position: Sitting, BP Cuff Size: Adult)   Pulse 89   Temp 36.3 °C (97.4 °F) (Temporal)   Ht 1.549 m (5' 1\")   Wt 52.3 kg (115 lb 3.2 oz)   SpO2 91%  Body mass index is 21.77 kg/m².    Physical Exam  Constitutional:       General: She is not in acute distress.     Appearance: Normal appearance. She is not ill-appearing or toxic-appearing.   HENT:      Head: Normocephalic.   Eyes:      Conjunctiva/sclera: Conjunctivae normal.   Cardiovascular:      Rate and Rhythm: Normal rate and regular rhythm.      Pulses: Normal pulses.      Heart sounds: Normal heart sounds. No murmur heard.  Pulmonary:      Effort: Pulmonary effort is normal. No respiratory distress.      Breath sounds: Normal breath sounds.   Skin:     General: Skin is warm and dry.   Neurological:      General: No focal deficit present.      Mental Status: She is alert and oriented to person, place, and time.   Psychiatric:         Mood and Affect: Mood normal.         Behavior: Behavior normal.           Labs:   Admission on 10/04/2023, Discharged on 10/06/2023   Component Date Value    Lactic Acid 10/04/2023 1.6     WBC 10/04/2023 14.4 (H)     RBC 10/04/2023 4.71     Hemoglobin 10/04/2023 15.3     Hematocrit 10/04/2023 44.0     MCV 10/04/2023 93.4     MCH 10/04/2023 32.5     MCHC 10/04/2023 34.8     RDW 10/04/2023 43.4     Platelet Count 10/04/2023 311     MPV 10/04/2023 9.1     Neutrophils-Polys 10/04/2023 83.10 (H)     " Lymphocytes 10/04/2023 7.00 (L)     Monocytes 10/04/2023 9.20     Eosinophils 10/04/2023 0.00     Basophils 10/04/2023 0.30     Immature Granulocytes 10/04/2023 0.40     Nucleated RBC 10/04/2023 0.00     Neutrophils (Absolute) 10/04/2023 11.93 (H)     Lymphs (Absolute) 10/04/2023 1.01     Monos (Absolute) 10/04/2023 1.32 (H)     Eos (Absolute) 10/04/2023 0.00     Baso (Absolute) 10/04/2023 0.05     Immature Granulocytes (a* 10/04/2023 0.06     NRBC (Absolute) 10/04/2023 0.00     Sodium 10/04/2023 128 (L)     Potassium 10/04/2023 4.0     Chloride 10/04/2023 91 (L)     Co2 10/04/2023 23     Anion Gap 10/04/2023 14.0     Glucose 10/04/2023 112 (H)     Bun 10/04/2023 7 (L)     Creatinine 10/04/2023 0.47 (L)     Calcium 10/04/2023 9.5     Correct Calcium 10/04/2023 8.9     AST(SGOT) 10/04/2023 21     ALT(SGPT) 10/04/2023 14     Alkaline Phosphatase 10/04/2023 86     Total Bilirubin 10/04/2023 0.3     Albumin 10/04/2023 4.7     Total Protein 10/04/2023 7.5     Globulin 10/04/2023 2.8     A-G Ratio 10/04/2023 1.7     Color 10/04/2023 Yellow     Character 10/04/2023 Clear     Specific Gravity 10/04/2023 1.015     Ph 10/04/2023 6.5     Glucose 10/04/2023 Negative     Ketones 10/04/2023 40 (A)     Protein 10/04/2023 30 (A)     Bilirubin 10/04/2023 Negative     Urobilinogen, Urine 10/04/2023 0.2     Nitrite 10/04/2023 Negative     Leukocyte Esterase 10/04/2023 Negative     Occult Blood 10/04/2023 Moderate (A)     Micro Urine Req 10/04/2023 Microscopic     Significant Indicator 10/04/2023 NEG     Source 10/04/2023 UR     Site 10/04/2023 -     Culture Result 10/04/2023 No growth at 48 hours.     Significant Indicator 10/04/2023 NEG     Source 10/04/2023 BLD     Site 10/04/2023 PERIPHERAL     Culture Result 10/04/2023 No growth after 5 days of incubation.     Significant Indicator 10/04/2023 NEG     Source 10/04/2023 BLD     Site 10/04/2023 Peripheral     Culture Result 10/04/2023 No growth after 5 days of incubation.     GFR  (CKD-EPI) 10/04/2023 104     Procalcitonin 10/04/2023 0.07     PT 10/04/2023 14.0     INR 10/04/2023 1.07     APTT 10/04/2023 28.6     WBC 10/04/2023 2-5     RBC 10/04/2023 20-50 (A)     Bacteria 10/04/2023 Few (A)     Epithelial Cells 10/04/2023 Few     Epithelial Cells Renal 10/04/2023 Rare     Hyaline Cast 10/04/2023 0-2     Eject.Frac. MOD BP 10/05/2023 67.7     Eject.Frac. MOD 4C 10/05/2023 60.89     Eject.Frac. MOD 2C 10/05/2023 73.43     Left Ventrical Ejection * 10/05/2023 65     Sodium 10/05/2023 128 (L)     Potassium 10/05/2023 3.7     Chloride 10/05/2023 93 (L)     Co2 10/05/2023 23     Glucose 10/05/2023 143 (H)     Bun 10/05/2023 9     Creatinine 10/05/2023 0.45 (L)     Calcium 10/05/2023 9.1     Anion Gap 10/05/2023 12.0     GFR (CKD-EPI) 10/05/2023 105     Fluid Type 10/05/2023 Pleural     Total Protein Fluid 10/05/2023 4.3     Body Fluid LDH 10/05/2023 857     Glucose, Fluid 10/05/2023 75     Fluid Type 10/05/2023 Pleural     Color-Body Fluid 10/05/2023 Yellow     Character-Body Fluid 10/05/2023 Turbid     Total RBC Count 10/05/2023 82475     FL Total Nucleated Cells 10/05/2023 149     Polys 10/05/2023 30     Lymphs 10/05/2023 23     Fl Mono Macrophages 10/05/2023 47     Body Fluid Amylase 10/05/2023 35     Cytology Reg 10/05/2023 See Path Report     NT-proBNP 10/05/2023 597 (H)     WBC 10/05/2023 10.1     RBC 10/05/2023 4.16 (L)     Hemoglobin 10/05/2023 14.0     Hematocrit 10/05/2023 39.5     MCV 10/05/2023 95.0     MCH 10/05/2023 33.7 (H)     MCHC 10/05/2023 35.4     RDW 10/05/2023 44.3     Platelet Count 10/05/2023 284     MPV 10/05/2023 9.2     Fluid Type 10/05/2023 Pleural     Ph Misc 10/05/2023 8.00     Significant Indicator 10/05/2023 NEG     Source 10/05/2023 BF     Site 10/05/2023 Thoracentesis Fluid     Culture Result 10/05/2023 No growth at 5 days.     Gram Stain Result 10/05/2023                      Value:Rare WBCs.  No organisms seen.      Significant Indicator 10/05/2023 NEG      Source 10/05/2023 BF     Site 10/05/2023 Thoracentesis Fluid     Culture Result 10/05/2023 Culture in progress.     AFB Smear Results 10/05/2023 No acid fast bacilli seen.     Significant Indicator 10/05/2023 NEG     Source 10/05/2023 BF     Site 10/05/2023 Thoracentesis Fluid     Culture Result 10/05/2023 No fungal growth to date.     Fungal Smear Results 10/05/2023 No fungal elements seen.     Significant Indicator 10/05/2023 NEG     Source 10/05/2023 BF     Site 10/05/2023 Thoracentesis Fluid     Fungal Smear Results 10/05/2023 No fungal elements seen.     Significant Indicator 10/05/2023 .     Source 10/05/2023 BF     Site 10/05/2023 Thoracentesis Fluid     Gram Stain Result 10/05/2023                      Value:Rare WBCs.  No organisms seen.      TSH 10/06/2023 0.190 (L)     WBC 10/06/2023 21.7 (H)     RBC 10/06/2023 4.02 (L)     Hemoglobin 10/06/2023 13.3     Hematocrit 10/06/2023 38.3     MCV 10/06/2023 95.3     MCH 10/06/2023 33.1 (H)     MCHC 10/06/2023 34.7     RDW 10/06/2023 45.1     Platelet Count 10/06/2023 311     MPV 10/06/2023 9.4     Sodium 10/06/2023 129 (L)     Potassium 10/06/2023 4.0     Chloride 10/06/2023 95 (L)     Co2 10/06/2023 25     Glucose 10/06/2023 119 (H)     Bun 10/06/2023 18     Creatinine 10/06/2023 0.54     Calcium 10/06/2023 9.5     Anion Gap 10/06/2023 9.0     Magnesium 10/06/2023 2.3     Phosphorus 10/06/2023 2.8     GFR (CKD-EPI) 10/06/2023 100     LDH Total 10/06/2023 305 (H)     Procalcitonin 10/06/2023 0.08     Significant Indicator 10/05/2023 NEG     Source 10/05/2023 BF     Site 10/05/2023 Thoracentesis Fluid     AFB Smear Results 10/05/2023 No acid fast bacilli seen.    Office Visit on 10/04/2023   Component Date Value    SARS-CoV-2 by PCR 10/04/2023 Negative     Influenza virus A RNA 10/04/2023 Negative     Influenza virus B, PCR 10/04/2023 Negative     RSV, PCR 10/04/2023 Negative          Assessment & Plan: Medical Decision Making   68 y.o. female with the following  -    Problem List Items Addressed This Visit       Acute hypoxemic respiratory failure (HCC)     Acute condition appears to have resolved  - ED precautions given and known for worsening or progression or recurrence of shortness of breath or chest pain.         Tobacco abuse     Patient declines resources at this time, she has quit cold turkey and is feeling good.         Relevant Orders    Lipid Profile    Acute exacerbation of chronic obstructive pulmonary disease (COPD) (HCC)     Chronic condition stable with recent exacerbation  -Recommend continuation of albuterol 108 mcg per attenuation 1 to 2 puffs every 6 hours if needed for bronchospasm  -Recommend continuation of Spiriva 5 mcg inhalation daily  -Continue to follow with pulmonary medicine as scheduled  -ED precautions given and known for worsening or progression of condition including any shortness of breath or chest pain         Pleural effusion    Low TSH level     Undiagnosed condition of uncertain prognosis, patient denies history of Graves' disease given she does have symptomology of fatigue and hair changes will check T3 and T4 levels as well as TSI.  If positive will send patient to endocrinology and likely obtain thyroid ultrasound.         Relevant Orders    T3 FREE    FREE THYROXINE    CBC WITH DIFFERENTIAL     Other Visit Diagnoses       Hyponatremia        Relevant Orders    Comp Metabolic Panel    TSI    Screening for cardiovascular condition        Relevant Orders    Lipid Profile    Abnormal TSH        Relevant Orders    T3 FREE    FREE THYROXINE    TSI            Differential diagnosis, natural history, supportive care, and indications for immediate follow-up discussed.  Shared decision making approach was utilized, and patient is amendable with plan of care.  Patient understands to return to clinic or go to the emergency department if symptoms worsen. All questions and concerns addressed to the best of my knowledge.      Return in about 3  months (around 1/13/2024).    Please note that this dictation was created using voice recognition software. I have made every reasonable attempt to correct obvious errors, but I expect that there are errors of grammar and possibly content that I did not discover before finalizing the note.

## 2023-10-13 NOTE — ASSESSMENT & PLAN NOTE
Undiagnosed condition of uncertain prognosis, patient denies history of Graves' disease given she does have symptomology of fatigue and hair changes will check T3 and T4 levels as well as TSI.  If positive will send patient to endocrinology and likely obtain thyroid ultrasound.

## 2023-10-24 ENCOUNTER — HOSPITAL ENCOUNTER (OUTPATIENT)
Dept: LAB | Facility: MEDICAL CENTER | Age: 68
End: 2023-10-24
Payer: MEDICARE

## 2023-10-24 DIAGNOSIS — Z13.6 SCREENING FOR CARDIOVASCULAR CONDITION: ICD-10-CM

## 2023-10-24 DIAGNOSIS — Z72.0 TOBACCO ABUSE: ICD-10-CM

## 2023-10-24 DIAGNOSIS — R79.89 ABNORMAL TSH: ICD-10-CM

## 2023-10-24 DIAGNOSIS — R79.89 LOW TSH LEVEL: ICD-10-CM

## 2023-10-24 DIAGNOSIS — E87.1 HYPONATREMIA: ICD-10-CM

## 2023-10-24 LAB
ALBUMIN SERPL BCP-MCNC: 4.1 G/DL (ref 3.2–4.9)
ALBUMIN/GLOB SERPL: 1.6 G/DL
ALP SERPL-CCNC: 77 U/L (ref 30–99)
ALT SERPL-CCNC: 18 U/L (ref 2–50)
ANION GAP SERPL CALC-SCNC: 10 MMOL/L (ref 7–16)
AST SERPL-CCNC: 25 U/L (ref 12–45)
BASOPHILS # BLD AUTO: 1.2 % (ref 0–1.8)
BASOPHILS # BLD: 0.09 K/UL (ref 0–0.12)
BILIRUB SERPL-MCNC: 0.3 MG/DL (ref 0.1–1.5)
BUN SERPL-MCNC: 10 MG/DL (ref 8–22)
CALCIUM ALBUM COR SERPL-MCNC: 9.3 MG/DL (ref 8.5–10.5)
CALCIUM SERPL-MCNC: 9.4 MG/DL (ref 8.5–10.5)
CHLORIDE SERPL-SCNC: 103 MMOL/L (ref 96–112)
CHOLEST SERPL-MCNC: 252 MG/DL (ref 100–199)
CO2 SERPL-SCNC: 25 MMOL/L (ref 20–33)
CREAT SERPL-MCNC: 0.53 MG/DL (ref 0.5–1.4)
EOSINOPHIL # BLD AUTO: 0.62 K/UL (ref 0–0.51)
EOSINOPHIL NFR BLD: 8.1 % (ref 0–6.9)
ERYTHROCYTE [DISTWIDTH] IN BLOOD BY AUTOMATED COUNT: 45.3 FL (ref 35.9–50)
GFR SERPLBLD CREATININE-BSD FMLA CKD-EPI: 101 ML/MIN/1.73 M 2
GLOBULIN SER CALC-MCNC: 2.6 G/DL (ref 1.9–3.5)
GLUCOSE SERPL-MCNC: 84 MG/DL (ref 65–99)
HCT VFR BLD AUTO: 41.8 % (ref 37–47)
HDLC SERPL-MCNC: 74 MG/DL
HGB BLD-MCNC: 14 G/DL (ref 12–16)
IMM GRANULOCYTES # BLD AUTO: 0.01 K/UL (ref 0–0.11)
IMM GRANULOCYTES NFR BLD AUTO: 0.1 % (ref 0–0.9)
LDLC SERPL CALC-MCNC: 162 MG/DL
LYMPHOCYTES # BLD AUTO: 2.56 K/UL (ref 1–4.8)
LYMPHOCYTES NFR BLD: 33.4 % (ref 22–41)
MCH RBC QN AUTO: 32.7 PG (ref 27–33)
MCHC RBC AUTO-ENTMCNC: 33.5 G/DL (ref 32.2–35.5)
MCV RBC AUTO: 97.7 FL (ref 81.4–97.8)
MONOCYTES # BLD AUTO: 0.92 K/UL (ref 0–0.85)
MONOCYTES NFR BLD AUTO: 12 % (ref 0–13.4)
NEUTROPHILS # BLD AUTO: 3.46 K/UL (ref 1.82–7.42)
NEUTROPHILS NFR BLD: 45.2 % (ref 44–72)
NRBC # BLD AUTO: 0 K/UL
NRBC BLD-RTO: 0 /100 WBC (ref 0–0.2)
PLATELET # BLD AUTO: 414 K/UL (ref 164–446)
PMV BLD AUTO: 9.4 FL (ref 9–12.9)
POTASSIUM SERPL-SCNC: 4.2 MMOL/L (ref 3.6–5.5)
PROT SERPL-MCNC: 6.7 G/DL (ref 6–8.2)
RBC # BLD AUTO: 4.28 M/UL (ref 4.2–5.4)
SODIUM SERPL-SCNC: 138 MMOL/L (ref 135–145)
T3FREE SERPL-MCNC: 3 PG/ML (ref 2–4.4)
T4 FREE SERPL-MCNC: 1.23 NG/DL (ref 0.93–1.7)
TRIGL SERPL-MCNC: 80 MG/DL (ref 0–149)
WBC # BLD AUTO: 7.7 K/UL (ref 4.8–10.8)

## 2023-10-24 PROCEDURE — 36415 COLL VENOUS BLD VENIPUNCTURE: CPT

## 2023-10-24 PROCEDURE — 84481 FREE ASSAY (FT-3): CPT

## 2023-10-24 PROCEDURE — 84445 ASSAY OF TSI GLOBULIN: CPT

## 2023-10-24 PROCEDURE — 84439 ASSAY OF FREE THYROXINE: CPT

## 2023-10-24 PROCEDURE — 80053 COMPREHEN METABOLIC PANEL: CPT

## 2023-10-24 PROCEDURE — 80061 LIPID PANEL: CPT

## 2023-10-24 PROCEDURE — 85025 COMPLETE CBC W/AUTO DIFF WBC: CPT

## 2023-10-25 ENCOUNTER — HOSPITAL ENCOUNTER (OUTPATIENT)
Dept: RADIOLOGY | Facility: MEDICAL CENTER | Age: 68
End: 2023-10-25
Attending: INTERNAL MEDICINE
Payer: MEDICARE

## 2023-10-25 DIAGNOSIS — J90 PLEURAL EFFUSION: ICD-10-CM

## 2023-10-25 LAB — TSI SER-ACNC: <0.1 IU/L

## 2023-10-25 PROCEDURE — 71250 CT THORAX DX C-: CPT

## 2023-10-26 ENCOUNTER — OFFICE VISIT (OUTPATIENT)
Dept: SLEEP MEDICINE | Facility: MEDICAL CENTER | Age: 68
End: 2023-10-26
Attending: INTERNAL MEDICINE
Payer: MEDICARE

## 2023-10-26 VITALS
WEIGHT: 119 LBS | OXYGEN SATURATION: 98 % | SYSTOLIC BLOOD PRESSURE: 104 MMHG | DIASTOLIC BLOOD PRESSURE: 72 MMHG | HEART RATE: 89 BPM | HEIGHT: 61 IN | BODY MASS INDEX: 22.47 KG/M2

## 2023-10-26 DIAGNOSIS — J90 RECURRENT PLEURAL EFFUSION ON RIGHT: ICD-10-CM

## 2023-10-26 DIAGNOSIS — Z72.0 TOBACCO ABUSE: ICD-10-CM

## 2023-10-26 DIAGNOSIS — J96.01 ACUTE HYPOXEMIC RESPIRATORY FAILURE (HCC): ICD-10-CM

## 2023-10-26 DIAGNOSIS — J90 PLEURAL EFFUSION: ICD-10-CM

## 2023-10-26 PROCEDURE — 3074F SYST BP LT 130 MM HG: CPT | Performed by: INTERNAL MEDICINE

## 2023-10-26 PROCEDURE — 3078F DIAST BP <80 MM HG: CPT | Performed by: INTERNAL MEDICINE

## 2023-10-26 PROCEDURE — 99215 OFFICE O/P EST HI 40 MIN: CPT | Performed by: INTERNAL MEDICINE

## 2023-10-26 PROCEDURE — 99212 OFFICE O/P EST SF 10 MIN: CPT | Performed by: INTERNAL MEDICINE

## 2023-10-26 RX ORDER — AZITHROMYCIN 250 MG/1
TABLET, FILM COATED ORAL
Qty: 6 TABLET | Refills: 0 | Status: ON HOLD | OUTPATIENT
Start: 2023-10-26 | End: 2024-01-22

## 2023-10-26 ASSESSMENT — ENCOUNTER SYMPTOMS
SPUTUM PRODUCTION: 0
COUGH: 0
WEIGHT LOSS: 0
WHEEZING: 0
SHORTNESS OF BREATH: 0
HEMOPTYSIS: 0

## 2023-10-26 ASSESSMENT — FIBROSIS 4 INDEX: FIB4 SCORE: 0.97

## 2023-10-26 NOTE — ASSESSMENT & PLAN NOTE
Exudative effusion of unclear etiology identified during hospitalization in 10/2023  Treated empirically with steroids, nebulizers, antibiotics and symptomatically improved  Follow-up CT chest shows reaccumulation of the effusion with heterogeneous contents and wall thickening, on my review there also appears to be shotty lymphadenopathy  She denies any fevers, weight loss, she is quit smoking but does have a 50-pack-year smoking history    -- Repeat diagnostic/therapeutic thoracentesis, right  -- Plan to repeat CXR 2 view after thoracentesis 1 to 2 weeks.  If reaccumulates again or nondiagnostic, may do a third thoracentesis or consideration for VATS vs PET/CT  -- She previously had a nonresolving pneumonia that was successfully cleared with a Z-Pantera, she is requesting a Z-Pantera today, we will prescribe however I did inform her this is unlikely to be effective

## 2023-10-26 NOTE — PROGRESS NOTES
"Pulmonary Clinic Note    Chief Complaint:  Chief Complaint   Patient presents with    Hospital Follow-up     HFV/CONSULT SHAY/DX:PNA & EFFUSION/DC:10/07/23      HPI:   Cyndee Mosley is a very pleasant 68 y.o. female who returns to the pulmonary clinic for follow-up after recent hospitalization for shortness of breath and congestion for 1 week prior, and was admitted for acute hypoxemic respiratory failure.  She has a 50-pack-year smoking history.  CT scan during hospitalization demonstrated a large right pleural effusion which was consistent with exudate (turbid, high LDH, hypocellular with rare mixed inflammatory cells on cytology ) however no signs of empyema.  There was also mild emphysema.  She was started on steroids, nebulizers, and empiric antibiotics, improved and was discharged home with an outpatient prescription for Spiriva and plans for repeat CT chest...    Interval events since hospitalization:  Sent home with a O2 concentrator which she is no longer requiring  Has continued right-sided chest wall pain, otherwise denies any respiratory symptoms  No fevers  No weight loss  Repeat CT chest yesterday shows reaccumulation of pleural effusion with \"heterogeneous content and wall thickening, new since prior\" there also appears to be shoddy lymphadenopathy  Continues to be smoke-free    Past Medical History:   Diagnosis Date    COPD (chronic obstructive pulmonary disease) (HCC)        Past Surgical History:   Procedure Laterality Date    BREAST RECONSTRUCTION Bilateral     Breast implants    CATARACT EXTRACTION WITH IOL Left     KNEE ARTHROSCOPY Right     meniscus repair       Social History     Socioeconomic History    Marital status:      Spouse name: Not on file    Number of children: Not on file    Years of education: Not on file    Highest education level: 12th grade   Occupational History    Not on file   Tobacco Use    Smoking status: Former     Current packs/day: 0.00     Types: " Cigarettes     Quit date: 10/4/2023     Years since quittin.0    Smokeless tobacco: Never   Vaping Use    Vaping Use: Never used   Substance and Sexual Activity    Alcohol use: Yes     Alcohol/week: 7.2 oz     Types: 12 Cans of beer per week    Drug use: Yes     Types: Marijuana     Comment: oral marijuana    Sexual activity: Not on file   Other Topics Concern    Not on file   Social History Narrative    Not on file     Social Determinants of Health     Financial Resource Strain: Low Risk  (10/6/2023)    Overall Financial Resource Strain (CARDIA)     Difficulty of Paying Living Expenses: Not hard at all   Food Insecurity: No Food Insecurity (10/6/2023)    Hunger Vital Sign     Worried About Running Out of Food in the Last Year: Never true     Ran Out of Food in the Last Year: Never true   Transportation Needs: No Transportation Needs (10/6/2023)    PRAPARE - Transportation     Lack of Transportation (Medical): No     Lack of Transportation (Non-Medical): No   Physical Activity: Insufficiently Active (10/6/2023)    Exercise Vital Sign     Days of Exercise per Week: 2 days     Minutes of Exercise per Session: 20 min   Stress: No Stress Concern Present (10/6/2023)    [employer] [place] [employer] - Occupational Stress Questionnaire     Feeling of Stress : Not at all   Social Connections: Moderately Isolated (10/6/2023)    Social Connection and Isolation Panel [NHANES]     Frequency of Communication with Friends and Family: More than three times a week     Frequency of Social Gatherings with Friends and Family: Three times a week     Attends Pentecostalism Services: Never     Active Member of Clubs or Organizations: No     Attends Club or Organization Meetings: Never     Marital Status:    Intimate Partner Violence: Not on file   Housing Stability: Low Risk  (10/6/2023)    Housing Stability Vital Sign     Unable to Pay for Housing in the Last Year: No     Number of Places Lived in the Last Year: 1      "Unstable Housing in the Last Year: No          Family History   Problem Relation Age of Onset    Cancer Mother         lymphoma    No Known Problems Father        Current Outpatient Medications on File Prior to Visit   Medication Sig Dispense Refill    tiotropium (SPIRIVA RESPIMAT) 2.5 mcg/Act Aero Soln Inhale 2 Inhalations every day for 90 days. 12 g 0    albuterol 108 (90 Base) MCG/ACT Aero Soln inhalation aerosol Inhale 1-2 Puffs every 6 hours as needed for Shortness of Breath.      Prenatal Vit-Fe Fumarate-FA (PRENATAL PO) Take 1 Tablet by mouth every morning.      Ascorbic Acid (VITAMIN C PO) Take 2 Tablets by mouth every morning.      Multiple Vitamins-Minerals (AIRBORNE GUMMIES PO) Take 1 Tablet by mouth every morning.      B Complex Vitamins (VITAMIN B COMPLEX) Tab Take 1 Tablet by mouth every morning.      Cholecalciferol (VITAMIN D3 PO) Take 1 Tablet by mouth every morning.      Misc Natural Products (PUMPKIN SEED OIL PO) Take 1 Tablet by mouth every morning.      Omega-3 Fatty Acids (FISH OIL PO) Take 1 Capsule by mouth every morning.      Multiple Vitamins-Minerals (HAIR SKIN AND NAILS FORMULA) Tab Take 2 Tablets by mouth every morning.       No current facility-administered medications on file prior to visit.       Allergies: Sulfa drugs      ROS:   Review of Systems   Constitutional:  Negative for weight loss.   Respiratory:  Negative for cough, hemoptysis, sputum production, shortness of breath and wheezing.    Cardiovascular:  Positive for chest pain (R chest wall).   All other systems reviewed and are negative.    Vitals:  /72 (BP Location: Left arm, Patient Position: Sitting, BP Cuff Size: Adult)   Pulse 89   Ht 1.549 m (5' 1\")   Wt 54 kg (119 lb)   SpO2 98%     Physical Exam:  Physical Exam  Vitals and nursing note reviewed.   Constitutional:       General: She is not in acute distress.     Appearance: Normal appearance. She is well-developed. She is not diaphoretic.      Comments: Very " pleasant  Accompanied by supportive spouse   Eyes:      General: No scleral icterus.        Right eye: No discharge.         Left eye: No discharge.      Conjunctiva/sclera: Conjunctivae normal.      Pupils: Pupils are equal, round, and reactive to light.   Neck:      Thyroid: No thyromegaly.      Vascular: No JVD.   Cardiovascular:      Rate and Rhythm: Normal rate and regular rhythm.      Heart sounds: Normal heart sounds. No murmur heard.     No gallop.   Pulmonary:      Effort: Pulmonary effort is normal. No respiratory distress.      Breath sounds: Normal breath sounds. No wheezing or rales.   Abdominal:      General: There is no distension.      Palpations: Abdomen is soft.      Tenderness: There is no abdominal tenderness. There is no guarding.   Musculoskeletal:         General: No tenderness.   Lymphadenopathy:      Cervical: No cervical adenopathy.   Skin:     General: Skin is warm.      Capillary Refill: Capillary refill takes less than 2 seconds.      Findings: No erythema or rash.   Neurological:      Mental Status: She is alert and oriented to person, place, and time.      Cranial Nerves: No cranial nerve deficit.      Sensory: No sensory deficit.      Motor: No abnormal muscle tone.   Psychiatric:         Behavior: Behavior normal.       Laboratory Data:    PFTs as reviewed by me personally show:  See HPI    Imaging as reviewed by me personally show:    See HPI    Assessment/Plan:    Problem List Items Addressed This Visit       Recurrent pleural effusion on right     Exudative effusion of unclear etiology identified during hospitalization in 10/2023  Treated empirically with steroids, nebulizers, antibiotics and symptomatically improved  Follow-up CT chest shows reaccumulation of the effusion with heterogeneous contents and wall thickening, on my review there also appears to be shotty lymphadenopathy  She denies any fevers, weight loss, she is quit smoking but does have a 50-pack-year smoking  history    -- Repeat diagnostic/therapeutic thoracentesis, right  -- Plan to repeat CXR 2 view after thoracentesis 1 to 2 weeks.  If reaccumulates again or nondiagnostic, may do a third thoracentesis or consideration for VATS vs PET/CT  -- She previously had a nonresolving pneumonia that was successfully cleared with a Z-Pantera, she is requesting a Z-Pantera today, we will prescribe however I did inform her this is unlikely to be effective         Relevant Medications    azithromycin (ZITHROMAX) 250 MG Tab    Other Relevant Orders    IR-THORACENTESIS PUNCTURE RIGHT    FLUID AMYLASE    FLUID PH    FLUID TOTAL PROTEIN    FLUID LDH    FLUID GLUCOSE    FLUID CELL COUNT    CYTOLOGY    FLUID CULTURE W/GRAM STAIN    AFB CULTURE    FUNGAL CULTURE    DX-CHEST-2 VIEWS    Acute hypoxemic respiratory failure (HCC)     Resolved, DC home O2         Tobacco abuse     50 pack years, quit 10/2023  PFTs and referral to lung cancer screening program when effusion issues are resolved          Return in about 4 weeks (around 11/23/2023) for MD only.     This note was generated using voice recognition software which has a chance of producing errors of grammar and possibly content.  I have made every reasonable attempt to find and correct any obvious errors, but it should be expected that some may not be found prior to finalization of this note.    Time spent in record review prior to patient arrival, reviewing results, and in face-to-face encounter totaled 44 min, excluding any procedures if performed.  __________  Sergey Mina MD  Pulmonary and Critical Care Medicine  [facility]

## 2023-10-26 NOTE — ASSESSMENT & PLAN NOTE
50 pack years, quit 10/2023  PFTs and referral to lung cancer screening program when effusion issues are resolved

## 2023-11-01 LAB
FUNGUS SPEC CULT: NORMAL
FUNGUS SPEC FUNGUS STN: NORMAL
SIGNIFICANT IND 70042: NORMAL
SITE SITE: NORMAL
SOURCE SOURCE: NORMAL

## 2023-11-03 ENCOUNTER — HOSPITAL ENCOUNTER (OUTPATIENT)
Dept: RADIOLOGY | Facility: MEDICAL CENTER | Age: 68
End: 2023-11-03
Attending: INTERNAL MEDICINE
Payer: MEDICARE

## 2023-11-03 ENCOUNTER — HOSPITAL ENCOUNTER (OUTPATIENT)
Dept: RADIOLOGY | Facility: MEDICAL CENTER | Age: 68
End: 2023-11-03
Attending: RADIOLOGY
Payer: MEDICARE

## 2023-11-03 DIAGNOSIS — R06.02 SHORTNESS OF BREATH: ICD-10-CM

## 2023-11-03 LAB
AMYLASE FLD-CCNC: 37 U/L
APPEARANCE FLD: NORMAL
BODY FLD TYPE: NORMAL
BODY FLD TYPE: NORMAL
COLOR FLD: NORMAL
CYTOLOGY REG CYTOL: NORMAL
EOSINOPHIL NFR FLD: 1 %
FUNGUS SPEC FUNGUS STN: NORMAL
GLUCOSE FLD-MCNC: 44 MG/DL
GRAM STN SPEC: NORMAL
LDH FLD L TO P-CCNC: 1923 U/L
LYMPHOCYTES NFR FLD: 16 %
MONOS+MACROS NFR FLD MANUAL: 8 %
NEUTROPHILS NFR FLD: 75 %
NUC CELL # FLD: 249 CELLS/UL
PROT FLD-MCNC: 3.4 G/DL
RBC # FLD: NORMAL CELLS/UL
SIGNIFICANT IND 70042: NORMAL
SIGNIFICANT IND 70042: NORMAL
SITE SITE: NORMAL
SITE SITE: NORMAL
SOURCE SOURCE: NORMAL
SOURCE SOURCE: NORMAL

## 2023-11-03 PROCEDURE — 87102 FUNGUS ISOLATION CULTURE: CPT

## 2023-11-03 PROCEDURE — 88112 CYTOPATH CELL ENHANCE TECH: CPT

## 2023-11-03 PROCEDURE — 89051 BODY FLUID CELL COUNT: CPT

## 2023-11-03 PROCEDURE — 82945 GLUCOSE OTHER FLUID: CPT

## 2023-11-03 PROCEDURE — 87116 MYCOBACTERIA CULTURE: CPT

## 2023-11-03 PROCEDURE — 87070 CULTURE OTHR SPECIMN AEROBIC: CPT

## 2023-11-03 PROCEDURE — 87015 SPECIMEN INFECT AGNT CONCNTJ: CPT

## 2023-11-03 PROCEDURE — 87205 SMEAR GRAM STAIN: CPT

## 2023-11-03 PROCEDURE — 83615 LACTATE (LD) (LDH) ENZYME: CPT

## 2023-11-03 PROCEDURE — 71045 X-RAY EXAM CHEST 1 VIEW: CPT

## 2023-11-03 PROCEDURE — 84157 ASSAY OF PROTEIN OTHER: CPT

## 2023-11-03 PROCEDURE — C1729 CATH, DRAINAGE: HCPCS

## 2023-11-03 PROCEDURE — 88305 TISSUE EXAM BY PATHOLOGIST: CPT

## 2023-11-03 PROCEDURE — 87206 SMEAR FLUORESCENT/ACID STAI: CPT

## 2023-11-03 PROCEDURE — 82150 ASSAY OF AMYLASE: CPT

## 2023-11-03 NOTE — PROGRESS NOTES
US guided therapeutic thoracentesis done by Dr. Birmingham;(no H&P required as this is a NON SEDATION procedure) Right mid posterior back access site; dressing CDI; 450 mL obtained and sent to lab. Pt did not tolerated the procedure well. Pt became hypotensive and light headed, near syncopal episode.STAT CXR was ordered and reviewed by MD. Pt was hemodynamically stable prior to discharge. All questions and concerns answered prior to d/c. Patient provided with all appropriate education for procedure. Pt d/c home.

## 2023-11-04 LAB
RHODAMINE-AURAMINE STN SPEC: NORMAL
SIGNIFICANT IND 70042: NORMAL
SITE SITE: NORMAL
SOURCE SOURCE: NORMAL

## 2023-11-11 ENCOUNTER — HOSPITAL ENCOUNTER (OUTPATIENT)
Dept: RADIOLOGY | Facility: MEDICAL CENTER | Age: 68
End: 2023-11-11
Attending: INTERNAL MEDICINE
Payer: MEDICARE

## 2023-11-11 PROCEDURE — 71046 X-RAY EXAM CHEST 2 VIEWS: CPT

## 2023-11-12 DIAGNOSIS — J90 RECURRENT PLEURAL EFFUSION ON RIGHT: ICD-10-CM

## 2023-11-12 NOTE — PROGRESS NOTES
CXR appears to show persistent atelectasis/effusion RLL and possibly new loculation    Imaging reviewed with pt, CT chest ordered, and will f/u with Dr Arora as planned later this week, anticipating thoracic surgery eval    1. Recurrent pleural effusion on right  CT-CHEST (THORAX) WITH        __________  Sergey Mina MD  Pulmonary and Critical Care Medicine  [facility]

## 2023-11-14 ENCOUNTER — HOSPITAL ENCOUNTER (OUTPATIENT)
Dept: RADIOLOGY | Facility: MEDICAL CENTER | Age: 68
End: 2023-11-14
Attending: INTERNAL MEDICINE
Payer: MEDICARE

## 2023-11-14 PROCEDURE — 71260 CT THORAX DX C+: CPT

## 2023-11-14 PROCEDURE — 700117 HCHG RX CONTRAST REV CODE 255: Performed by: INTERNAL MEDICINE

## 2023-11-14 RX ADMIN — IOHEXOL 75 ML: 350 INJECTION, SOLUTION INTRAVENOUS at 13:30

## 2023-11-17 ENCOUNTER — OFFICE VISIT (OUTPATIENT)
Dept: SLEEP MEDICINE | Facility: MEDICAL CENTER | Age: 68
End: 2023-11-17
Attending: INTERNAL MEDICINE
Payer: MEDICARE

## 2023-11-17 VITALS
OXYGEN SATURATION: 93 % | HEART RATE: 105 BPM | HEIGHT: 61 IN | SYSTOLIC BLOOD PRESSURE: 112 MMHG | DIASTOLIC BLOOD PRESSURE: 68 MMHG | WEIGHT: 120 LBS | BODY MASS INDEX: 22.66 KG/M2

## 2023-11-17 DIAGNOSIS — J90 PLEURAL EFFUSION: ICD-10-CM

## 2023-11-17 PROCEDURE — 99213 OFFICE O/P EST LOW 20 MIN: CPT | Performed by: INTERNAL MEDICINE

## 2023-11-17 PROCEDURE — 3078F DIAST BP <80 MM HG: CPT | Performed by: INTERNAL MEDICINE

## 2023-11-17 PROCEDURE — 99215 OFFICE O/P EST HI 40 MIN: CPT | Performed by: INTERNAL MEDICINE

## 2023-11-17 PROCEDURE — 3074F SYST BP LT 130 MM HG: CPT | Performed by: INTERNAL MEDICINE

## 2023-11-17 ASSESSMENT — FIBROSIS 4 INDEX: FIB4 SCORE: 0.97

## 2023-11-17 NOTE — PATIENT INSTRUCTIONS
I have referred you to Thoracic surgery (Dr. Bonds or Dr. Ganser) to discuss pleuroscopy versus VATs and possibly a pleurodesis surgery.  I am concerned that this fluid is from a cancer. You need to get up-to-date with cancer screening (breast, colon, ovarian, uterine, cervical, skin checks, etc).   I have also ordered bloodwork to look for an autoimmune cause. This is not fasting bloodwork.

## 2023-11-17 NOTE — PROGRESS NOTES
"Pulmonary Consult      Reason for consult: history of pleural effusion    Chief Complaint:  Chief Complaint   Patient presents with    Follow-Up      Pleural effusion. Last seen 10/28/23    Results     CT-Chest 11/14/23     HPI:  68F with a history of recurrent right sided exudative pleural effusion. Extensive smoking history. Emphysema on CT.     TODAY:  Since last clinic visit:   There are no new ROS complaints today.   Nov 2023: Underwent thoracentesis in IR. Exudative by LDH. Glucose 44. Red color. Cytology negative.   She stated she is NOT up to date with cancer screenings.  Denies connective tissue/autoimmune related complaints.     Pulmonary History:  From Dr. Mina's Oct 2023 note: \"returns to the pulmonary clinic for follow-up after recent hospitalization for shortness of breath and congestion for 1 week prior, and was admitted for acute hypoxemic respiratory failure.  She has a 50-pack-year smoking history.  CT scan during hospitalization demonstrated a large right pleural effusion which was consistent with exudate (turbid, high LDH, hypocellular with rare mixed inflammatory cells on cytology ) however no signs of empyema.  There was also mild emphysema.  She was started on steroids, nebulizers, and empiric antibiotics, improved and was discharged home with an outpatient prescription for Spiriva and plans for repeat CT chest...     Interval events since hospitalization:  Sent home with a O2 concentrator which she is no longer requiring  Has continued right-sided chest wall pain, otherwise denies any respiratory symptoms  No fevers  No weight loss  Repeat CT chest yesterday shows reaccumulation of pleural effusion with \"heterogeneous content and wall thickening, new since prior\" there also appears to be shoddy lymphadenopathy  Continues to be smoke-free\"    Past Medical History:   Diagnosis Date    COPD (chronic obstructive pulmonary disease) (HCC)        Past Surgical History:   Procedure Laterality Date "    BREAST RECONSTRUCTION Bilateral     Breast implants    CATARACT EXTRACTION WITH IOL Left     KNEE ARTHROSCOPY Right     meniscus repair       Social History     Socioeconomic History    Marital status:      Spouse name: Not on file    Number of children: Not on file    Years of education: Not on file    Highest education level: 12th grade   Occupational History    Not on file   Tobacco Use    Smoking status: Former     Current packs/day: 0.00     Types: Cigarettes     Quit date: 10/4/2023     Years since quittin.1    Smokeless tobacco: Never   Vaping Use    Vaping Use: Never used   Substance and Sexual Activity    Alcohol use: Yes     Alcohol/week: 7.2 oz     Types: 12 Cans of beer per week    Drug use: Yes     Types: Marijuana     Comment: oral marijuana    Sexual activity: Not on file   Other Topics Concern    Not on file   Social History Narrative    Not on file     Social Determinants of Health     Financial Resource Strain: Low Risk  (10/6/2023)    Overall Financial Resource Strain (CARDIA)     Difficulty of Paying Living Expenses: Not hard at all   Food Insecurity: No Food Insecurity (10/6/2023)    Hunger Vital Sign     Worried About Running Out of Food in the Last Year: Never true     Ran Out of Food in the Last Year: Never true   Transportation Needs: No Transportation Needs (10/6/2023)    PRAPARE - Transportation     Lack of Transportation (Medical): No     Lack of Transportation (Non-Medical): No   Physical Activity: Insufficiently Active (10/6/2023)    Exercise Vital Sign     Days of Exercise per Week: 2 days     Minutes of Exercise per Session: 20 min   Stress: No Stress Concern Present (10/6/2023)    [employer] [employer] - Occupational Stress Questionnaire     Feeling of Stress : Not at all   Social Connections: Moderately Isolated (10/6/2023)    Social Connection and Isolation Panel [NHANES]     Frequency of Communication with Friends and Family: More than three times  a week     Frequency of Social Gatherings with Friends and Family: Three times a week     Attends Sabianism Services: Never     Active Member of Clubs or Organizations: No     Attends Club or Organization Meetings: Never     Marital Status:    Intimate Partner Violence: Not on file   Housing Stability: Low Risk  (10/6/2023)    Housing Stability Vital Sign     Unable to Pay for Housing in the Last Year: No     Number of Places Lived in the Last Year: 1     Unstable Housing in the Last Year: No          Family History   Problem Relation Age of Onset    Cancer Mother         lymphoma    No Known Problems Father        Current Outpatient Medications on File Prior to Visit   Medication Sig Dispense Refill    azithromycin (ZITHROMAX) 250 MG Tab Take 2 tablets on day 1, then take 1 tablet a day for 4 days. 6 Tablet 0    tiotropium (SPIRIVA RESPIMAT) 2.5 mcg/Act Aero Soln Inhale 2 Inhalations every day for 90 days. 12 g 0    albuterol 108 (90 Base) MCG/ACT Aero Soln inhalation aerosol Inhale 1-2 Puffs every 6 hours as needed for Shortness of Breath.      Prenatal Vit-Fe Fumarate-FA (PRENATAL PO) Take 1 Tablet by mouth every morning.      Ascorbic Acid (VITAMIN C PO) Take 2 Tablets by mouth every morning.      Multiple Vitamins-Minerals (AIRBORNE GUMMIES PO) Take 1 Tablet by mouth every morning.      B Complex Vitamins (VITAMIN B COMPLEX) Tab Take 1 Tablet by mouth every morning.      Cholecalciferol (VITAMIN D3 PO) Take 1 Tablet by mouth every morning.      Misc Natural Products (PUMPKIN SEED OIL PO) Take 1 Tablet by mouth every morning.      Omega-3 Fatty Acids (FISH OIL PO) Take 1 Capsule by mouth every morning.      Multiple Vitamins-Minerals (HAIR SKIN AND NAILS FORMULA) Tab Take 2 Tablets by mouth every morning.       No current facility-administered medications on file prior to visit.       Allergies: Sulfa drugs      ROS: A 12 point ROS was performed on intake and during my interview. ROS negative unless  "specifically noted in HPI.     Vitals:  Pulse (!) 105   Ht 1.549 m (5' 1\")   Wt 54.4 kg (120 lb)   SpO2 93%     Wt Readings from Last 3 Encounters:   11/17/23 54.4 kg (120 lb)   10/26/23 54 kg (119 lb)   10/13/23 52.3 kg (115 lb 3.2 oz)       Physical Exam:  Physical Exam  Vitals reviewed. Exam conducted with a chaperone present.   Constitutional:       General: She is not in acute distress.     Appearance: Normal appearance. She is not ill-appearing, toxic-appearing or diaphoretic.   HENT:      Mouth/Throat:      Pharynx: Oropharynx is clear.   Eyes:      General:         Right eye: No discharge.         Left eye: No discharge.      Conjunctiva/sclera: Conjunctivae normal.   Cardiovascular:      Rate and Rhythm: Regular rhythm. Tachycardia present.      Pulses: Normal pulses.   Pulmonary:      Effort: Pulmonary effort is normal.      Breath sounds: Examination of the right-lower field reveals decreased breath sounds. Decreased breath sounds present.   Musculoskeletal:      Right lower leg: No edema.      Left lower leg: No edema.   Skin:     General: Skin is warm.      Capillary Refill: Capillary refill takes less than 2 seconds.      Coloration: Skin is not jaundiced.   Neurological:      General: No focal deficit present.      Mental Status: She is alert.   Psychiatric:         Behavior: Behavior normal.         Laboratory Data: I personally reviewed labs including historical lab trends.     Immunization History   Administered Date(s) Administered    MODERNA SARS-COV-2 VACCINE (12+) 04/01/2021, 04/29/2021, 03/31/2022         Imaging as reviewed by me personally show:  right sided effusion    Assessment/Plan:    Pulmonary problem list:  #Exudative right sided effusion - recurrent   #Tobacco abuse - in remission    Plan:  -refer to thoracic surgery for pleuroscopy or VATs +/- pleurodesis. Direct inspection of the pleural space is requested due to the exudative and recurrent nature of the effusion.  -autoimmune " serologies ordered  -strongly encouraged to get updated on her age-related and gender-related cancer screenings.     Total consult time was 43 minutes. This includes reviewing previous provider notes, personally reviewing previous imaging and spirometry, educating the patient about their disease process, and inhaler education.   __________  Odell Mcghee, DO  Pulmonary and Critical Care Medicine  [facility]    Please note that this dictation was created using voice recognition software. The accuracy of the dictation is limited to the abilities of the software. I have made every reasonable attempt to correct obvious errors, but I expect that there are errors of grammar and possibly content that I did not discover before finalizing the note.

## 2023-11-18 LAB
MYCOBACTERIUM SPEC CULT: NORMAL
RHODAMINE-AURAMINE STN SPEC: NORMAL
SIGNIFICANT IND 70042: NORMAL
SITE SITE: NORMAL
SOURCE SOURCE: NORMAL

## 2023-11-30 ENCOUNTER — HOSPITAL ENCOUNTER (OUTPATIENT)
Dept: LAB | Facility: MEDICAL CENTER | Age: 68
End: 2023-11-30
Payer: MEDICARE

## 2023-11-30 ENCOUNTER — OFFICE VISIT (OUTPATIENT)
Dept: MEDICAL GROUP | Facility: PHYSICIAN GROUP | Age: 68
End: 2023-11-30
Payer: MEDICARE

## 2023-11-30 VITALS
TEMPERATURE: 98.6 F | SYSTOLIC BLOOD PRESSURE: 110 MMHG | HEIGHT: 61 IN | BODY MASS INDEX: 22.47 KG/M2 | DIASTOLIC BLOOD PRESSURE: 60 MMHG | WEIGHT: 119 LBS | HEART RATE: 91 BPM | OXYGEN SATURATION: 96 %

## 2023-11-30 DIAGNOSIS — Z12.31 ENCOUNTER FOR SCREENING MAMMOGRAM FOR MALIGNANT NEOPLASM OF BREAST: ICD-10-CM

## 2023-11-30 DIAGNOSIS — R06.02 SHORTNESS OF BREATH: ICD-10-CM

## 2023-11-30 DIAGNOSIS — J90 RECURRENT PLEURAL EFFUSION ON RIGHT: ICD-10-CM

## 2023-11-30 DIAGNOSIS — Z12.12 SCREENING FOR COLORECTAL CANCER: ICD-10-CM

## 2023-11-30 DIAGNOSIS — Z78.0 POST-MENOPAUSAL: ICD-10-CM

## 2023-11-30 DIAGNOSIS — Z12.11 SCREENING FOR COLORECTAL CANCER: ICD-10-CM
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
## 2023-11-30 PROCEDURE — 3078F DIAST BP <80 MM HG: CPT

## 2023-11-30 PROCEDURE — 86618 LYME DISEASE ANTIBODY: CPT

## 2023-11-30 PROCEDURE — 3074F SYST BP LT 130 MM HG: CPT

## 2023-11-30 PROCEDURE — 99214 OFFICE O/P EST MOD 30 MIN: CPT

## 2023-11-30 PROCEDURE — 36415 COLL VENOUS BLD VENIPUNCTURE: CPT

## 2023-11-30 ASSESSMENT — ENCOUNTER SYMPTOMS
FEVER: 0
WEIGHT LOSS: 0
SHORTNESS OF BREATH: 1
COUGH: 1
CHILLS: 0
BLURRED VISION: 0
WEAKNESS: 0
CONSTIPATION: 0
NAUSEA: 0
MYALGIAS: 0
ABDOMINAL PAIN: 0
DIARRHEA: 0
VOMITING: 0
HEADACHES: 0
DIZZINESS: 0

## 2023-11-30 ASSESSMENT — FIBROSIS 4 INDEX: FIB4 SCORE: 0.97

## 2023-11-30 NOTE — PROGRESS NOTES
Subjective:     CC: pleural effusion follow up    HPI:   Cyndee presents today with    Problem   Shortness of Breath   Tick Bite    Reports have been bitten by tick multiple times in life.  She is curious if she may have Lyme disease or valley fever as she did live in [facility].  She would like testing for lyme disease. She does endorse joint pain and hand swelling, night sweats, recurrent pleural effusion. Most recent bite was about 5 years ago.      Recurrent Pleural Effusion On Right    Patient presents today with her  with ongoing concerns for pleural effusion and recent visit to pulmonologist.  Noted during hospitalization in 10/2023. Workup negative and etiology unknown. Referred to pulmonology. She saw Dr. Arellano. They feel he has not ordered the correct testing or referral.  They state he told her she has cancer and that is why she keeps getting pleural effusion, she was then referred to general surgery.  Patient had assumed that they were referred to [facility] Dr. Ganser.  However, when the patient called [facility], they were not able to schedule because they had not received orders from Dr. Arellano nor referral.  Referrals tab checked today and she has been referred to  Department Of Surgery  [address] [facility], [address]  [facility] [address]  Phone: [phone]  Fortunately patient has quit smoking at this time.           ROS:  Review of Systems   Constitutional:  Negative for chills, fever, malaise/fatigue and weight loss.   Eyes:  Negative for blurred vision.   Respiratory:  Positive for cough and shortness of breath.    Cardiovascular:  Negative for chest pain.   Gastrointestinal:  Negative for abdominal pain, constipation, diarrhea, nausea and vomiting.   Musculoskeletal:  Negative for myalgias.   Neurological:  Negative for dizziness, weakness and headaches.       Objective:     Exam:  /60 (BP Location: Left arm, Patient Position: Sitting, BP Cuff Size: Adult)  "  Pulse 91   Temp 37 °C (98.6 °F) (Temporal)   Ht 1.549 m (5' 1\")   Wt 54 kg (119 lb)   SpO2 96%   BMI 22.48 kg/m²  Body mass index is 22.48 kg/m².    Physical Exam  Constitutional:       General: She is not in acute distress.     Appearance: Normal appearance. She is not ill-appearing or toxic-appearing.   HENT:      Head: Normocephalic.   Eyes:      Conjunctiva/sclera: Conjunctivae normal.   Cardiovascular:      Rate and Rhythm: Normal rate and regular rhythm.      Pulses: Normal pulses.      Heart sounds: Normal heart sounds. No murmur heard.  Pulmonary:      Effort: Pulmonary effort is normal. No respiratory distress.      Breath sounds: Normal breath sounds.      Comments: Diminished right lower lobe otherwise clear throughout  Skin:     General: Skin is warm and dry.   Neurological:      General: No focal deficit present.      Mental Status: She is alert and oriented to person, place, and time.   Psychiatric:         Mood and Affect: Mood normal.         Behavior: Behavior normal.         Labs:   Hospital Outpatient Visit on 11/03/2023   Component Date Value    Fluid Type 11/03/2023 Pleural     Glucose, Fluid 11/03/2023 44     Body Fluid LDH 11/03/2023 1923     Total Protein Fluid 11/03/2023 3.4     Body Fluid Amylase 11/03/2023 37     Fluid Type 11/03/2023 Pleural     Color-Body Fluid 11/03/2023 Red     Character-Body Fluid 11/03/2023 Bloody     Total RBC Count 11/03/2023 599317     FL Total Nucleated Cells 11/03/2023 249     Polys 11/03/2023 75     Lymphs 11/03/2023 16     Eosinophils - CSF 11/03/2023 1     Fl Mono Macrophages 11/03/2023 8     Cytology Reg 11/03/2023 See Path Report     Significant Indicator 11/03/2023 NEG     Source 11/03/2023 BF     Site 11/03/2023 Right thoracentesis     Culture Result 11/03/2023 No fungal growth.     Fungal Smear Results 11/03/2023 No fungal elements seen.     Significant Indicator 11/03/2023 NEG     Source 11/03/2023 BF     Site 11/03/2023 Right thoracentesis     " "Culture Result 11/03/2023 Culture in progress.     AFB Smear Results 11/03/2023 No acid fast bacilli seen.     Significant Indicator 11/03/2023 NEG     Source 11/03/2023 BF     Site 11/03/2023 Right thoracentesis     Culture Result 11/03/2023 No growth at 72 hours.     Gram Stain Result 11/03/2023                      Value:Few WBCs.  No organisms seen.      Significant Indicator 11/03/2023 .     Source 11/03/2023 BF     Site 11/03/2023 Right thoracentesis     Gram Stain Result 11/03/2023                      Value:Few WBCs.  No organisms seen.      Significant Indicator 11/03/2023 NEG     Source 11/03/2023 BF     Site 11/03/2023 Right thoracentesis     Fungal Smear Results 11/03/2023 No fungal elements seen.     Significant Indicator 11/03/2023 NEG     Source 11/03/2023 BF     Site 11/03/2023 Right thoracentesis     AFB Smear Results 11/03/2023 No acid fast bacilli seen.      Lab Results   Component Value Date/Time    CHOLSTRLTOT 252 (H) 10/24/2023 07:22 AM     (H) 10/24/2023 07:22 AM    HDL 74 10/24/2023 07:22 AM    TRIGLYCERIDE 80 10/24/2023 07:22 AM       Lab Results   Component Value Date/Time    SODIUM 138 10/24/2023 07:22 AM    POTASSIUM 4.2 10/24/2023 07:22 AM    CHLORIDE 103 10/24/2023 07:22 AM    CO2 25 10/24/2023 07:22 AM    GLUCOSE 84 10/24/2023 07:22 AM    BUN 10 10/24/2023 07:22 AM    CREATININE 0.53 10/24/2023 07:22 AM     Lab Results   Component Value Date/Time    ALKPHOSPHAT 77 10/24/2023 07:22 AM    ASTSGOT 25 10/24/2023 07:22 AM    ALTSGPT 18 10/24/2023 07:22 AM    TBILIRUBIN 0.3 10/24/2023 07:22 AM      No results found for: \"HBA1C\"  No results found for: \"TSH\"  Lab Results   Component Value Date/Time    FREET4 1.23 10/24/2023 07:22 AM     No results found for: \"CBC\"      Assessment & Plan: Medical Decision Making     68 y.o. female with the following -     Problem List Items Addressed This Visit       Recurrent pleural effusion on right     Ongoing chronic condition-controlled  -Strongly " recommend follow-up with surgery group that was referred to by Dr. Arellano.  Name, address, and phone number provided to patient and her  today at visit.  I have urged her to call and schedule an appointment as soon as possible  -Strongly recommend follow-up with pulmonology Dr. Arellano as scheduled  -ED precautions given and known for worsening or progression of this condition including any chest pain or shortness of breath or hemoptysis         Relevant Orders    LYME AB/WESTERN BLOT REFLEX    Tick bite     Undiagnosed condition of uncertain prognosis  -Lyme with reflex to Western blot ordered as requested         Relevant Orders    LYME AB/WESTERN BLOT REFLEX    Shortness of breath    Relevant Orders    LYME AB/WESTERN BLOT REFLEX     Other Visit Diagnoses       Encounter for screening mammogram for malignant neoplasm of breast        Relevant Orders    US-SCREENING WHOLE BREAST BILATERAL (3D SCREENING)    Screening for colorectal cancer        Relevant Orders    COLOGUARD (FIT DNA)    Post-menopausal        Relevant Orders    DS-BONE DENSITY STUDY (DEXA)            Differential diagnosis, natural history, supportive care, and indications for immediate follow-up discussed.  Shared decision making approach utilized, and patient is amendable with plan of care.  Patient understands to return to clinic or go to the emergency department if symptoms worsen. All questions and concerns addressed to the best of my knowledge.    Return if symptoms worsen or fail to improve.    Please note that this dictation was created using voice recognition software. I have made every reasonable attempt to correct obvious errors, but I expect that there are errors of grammar and possibly content that I did not discover before finalizing the note.    Time: 32 minutes in total spend on patient care including record review, face-to-face- time with patient including counseling and coordinating care, ordering and reviewing lab  results and/or imaging studies, medication management, and documentation of this encounter.

## 2023-11-30 NOTE — ASSESSMENT & PLAN NOTE
Ongoing chronic condition-controlled  -Strongly recommend follow-up with surgery group that was referred to by Dr. Arellano.  Name, address, and phone number provided to patient and her  today at visit.  I have urged her to call and schedule an appointment as soon as possible  -Strongly recommend follow-up with pulmonology Dr. Arellano as scheduled  -ED precautions given and known for worsening or progression of this condition including any chest pain or shortness of breath or hemoptysis

## 2023-11-30 NOTE — ASSESSMENT & PLAN NOTE
Undiagnosed condition of uncertain prognosis  -Lyme with reflex to Western blot ordered as requested

## 2023-12-01 ENCOUNTER — TELEPHONE (OUTPATIENT)
Dept: SLEEP MEDICINE | Facility: MEDICAL CENTER | Age: 68
End: 2023-12-01
Payer: MEDICARE

## 2023-12-01 NOTE — TELEPHONE ENCOUNTER
"VOICEMAIL: 11/28/23  1. Caller Name: Lico                      Call Back Number: [phone]    2. Message: Lico called and lm, re\" Pulmonary confusion.     3. Patient approves office to leave a detailed voicemail/MyChart message: N\A      12/01/23:  Called and spoke with pt&spouse. Asked him if he reached [facility]. He said he is unable to get through. Lico is feeling frustrated. He's afraid pt is going to end up back in the hospital. She is experiencing same sxs prior to getting building fluid in the lungs. Told him I will call [facility] to make sure they received the referral I have faxed on 11/29/23.     Called and spoke with [facility], they have received referral and ready to schedule.  They said to have pt call [phone] opt [phone] to schedule.    Called and spoke with pt/spouse. Notified them of this. They are going to call them to schedule.   "

## 2023-12-03 LAB — B BURGDOR.VLSE1+PEPC10 AB SER IA-ACNC: 0.48 IV

## 2023-12-05 ENCOUNTER — HOSPITAL ENCOUNTER (OUTPATIENT)
Dept: LAB | Facility: MEDICAL CENTER | Age: 68
End: 2023-12-05
Attending: INTERNAL MEDICINE
Payer: MEDICARE

## 2023-12-05 DIAGNOSIS — J90 PLEURAL EFFUSION: ICD-10-CM

## 2023-12-05 PROCEDURE — 83516 IMMUNOASSAY NONANTIBODY: CPT

## 2023-12-05 PROCEDURE — 86235 NUCLEAR ANTIGEN ANTIBODY: CPT | Mod: 91

## 2023-12-05 PROCEDURE — 36415 COLL VENOUS BLD VENIPUNCTURE: CPT

## 2023-12-08 LAB
CENTROMERE IGG TITR SER IF: 1 AU/ML (ref 0–40)
ENA JO1 AB TITR SER: 0 AU/ML (ref 0–40)
ENA SCL70 IGG SER QL: 0 AU/ML (ref 0–40)
ENA SM IGG SER-ACNC: 1 AU/ML (ref 0–40)
ENA SS-B IGG SER IA-ACNC: 1 AU/ML (ref 0–40)
RIBOSOMAL P AB SER-ACNC: 3 AU/ML (ref 0–40)
SSA52 R0ENA AB IGG Q0420: 1 AU/ML (ref 0–40)
SSA60 R0ENA AB IGG Q0419: 1 AU/ML (ref 0–40)
U1 SNRNP IGG SER QL: 2 UNITS (ref 0–19)

## 2023-12-15 ENCOUNTER — APPOINTMENT (OUTPATIENT)
Dept: ADMISSIONS | Facility: MEDICAL CENTER | Age: 68
DRG: 163 | End: 2023-12-15
Attending: STUDENT IN AN ORGANIZED HEALTH CARE EDUCATION/TRAINING PROGRAM
Payer: MEDICARE

## 2023-12-19 PROBLEM — R06.02 SHORTNESS OF BREATH: Status: ACTIVE | Noted: 2023-12-19

## 2023-12-26 ENCOUNTER — PRE-ADMISSION TESTING (OUTPATIENT)
Dept: ADMISSIONS | Facility: MEDICAL CENTER | Age: 68
DRG: 163 | End: 2023-12-26
Attending: STUDENT IN AN ORGANIZED HEALTH CARE EDUCATION/TRAINING PROGRAM
Payer: MEDICARE

## 2024-01-08 ENCOUNTER — HOSPITAL ENCOUNTER (OUTPATIENT)
Dept: RADIOLOGY | Facility: MEDICAL CENTER | Age: 69
End: 2024-01-08
Payer: MEDICARE

## 2024-01-08 ENCOUNTER — TELEPHONE (OUTPATIENT)
Dept: SLEEP MEDICINE | Facility: MEDICAL CENTER | Age: 69
End: 2024-01-08

## 2024-01-08 ENCOUNTER — HOSPITAL ENCOUNTER (OUTPATIENT)
Dept: RADIOLOGY | Facility: MEDICAL CENTER | Age: 69
End: 2024-01-08
Attending: INTERNAL MEDICINE
Payer: MEDICARE

## 2024-01-08 DIAGNOSIS — J90 RECURRENT PLEURAL EFFUSION ON RIGHT: ICD-10-CM

## 2024-01-08 DIAGNOSIS — Z78.0 POST-MENOPAUSAL: ICD-10-CM

## 2024-01-08 PROCEDURE — 77080 DXA BONE DENSITY AXIAL: CPT

## 2024-01-08 PROCEDURE — 71046 X-RAY EXAM CHEST 2 VIEWS: CPT

## 2024-01-08 NOTE — TELEPHONE ENCOUNTER
Spoke with patient's , Lico at his appointment today.  He stated that patient was having a really hard time with her breathing again and having some chest pain.  I called and discussed with patient and she asserts that she is having trouble shortness of breath.  I ordered an x-ray for her to complete today.  I also discussed with Dr. Bonds, as there is plan for VATS and decortication on 1/22/2024, he states that if she needs a repeat thoracentesis at this time, we can proceed with that and continue with plan for VATS on 1/22/2024.    Estephania Arora, DO   Pulmonary and Critical Care    1/9/2024  Chest x-ray showing reaccumulation of fluid.  Patient is symptomatic.  IR thoracentesis ordered, with request to not remove more than 300 cc as it causes patient to have reexpansion pain.  Hope is to get patient through until her VATS on 1/22/2024.    Estephania Arora, DO   Pulmonary and Critical Care

## 2024-01-15 ENCOUNTER — PRE-ADMISSION TESTING (OUTPATIENT)
Dept: ADMISSIONS | Facility: MEDICAL CENTER | Age: 69
DRG: 163 | End: 2024-01-15
Attending: STUDENT IN AN ORGANIZED HEALTH CARE EDUCATION/TRAINING PROGRAM
Payer: MEDICARE

## 2024-01-15 DIAGNOSIS — Z01.812 PRE-OPERATIVE LABORATORY EXAMINATION: ICD-10-CM

## 2024-01-15 DIAGNOSIS — Z01.810 PRE-OPERATIVE CARDIOVASCULAR EXAMINATION: ICD-10-CM

## 2024-01-15 LAB
ALBUMIN SERPL BCP-MCNC: 4.5 G/DL (ref 3.2–4.9)
ALBUMIN/GLOB SERPL: 1.3 G/DL
ALP SERPL-CCNC: 86 U/L (ref 30–99)
ALT SERPL-CCNC: 17 U/L (ref 2–50)
ANION GAP SERPL CALC-SCNC: 13 MMOL/L (ref 7–16)
AST SERPL-CCNC: 13 U/L (ref 12–45)
BILIRUB SERPL-MCNC: 0.4 MG/DL (ref 0.1–1.5)
BUN SERPL-MCNC: 11 MG/DL (ref 8–22)
CALCIUM ALBUM COR SERPL-MCNC: 9.4 MG/DL (ref 8.5–10.5)
CALCIUM SERPL-MCNC: 9.8 MG/DL (ref 8.5–10.5)
CHLORIDE SERPL-SCNC: 99 MMOL/L (ref 96–112)
CO2 SERPL-SCNC: 25 MMOL/L (ref 20–33)
CREAT SERPL-MCNC: 0.59 MG/DL (ref 0.5–1.4)
EKG IMPRESSION: NORMAL
ERYTHROCYTE [DISTWIDTH] IN BLOOD BY AUTOMATED COUNT: 45.3 FL (ref 35.9–50)
GFR SERPLBLD CREATININE-BSD FMLA CKD-EPI: 98 ML/MIN/1.73 M 2
GLOBULIN SER CALC-MCNC: 3.4 G/DL (ref 1.9–3.5)
GLUCOSE SERPL-MCNC: 95 MG/DL (ref 65–99)
HCT VFR BLD AUTO: 42 % (ref 37–47)
HGB BLD-MCNC: 14.8 G/DL (ref 12–16)
MCH RBC QN AUTO: 33.6 PG (ref 27–33)
MCHC RBC AUTO-ENTMCNC: 35.2 G/DL (ref 32.2–35.5)
MCV RBC AUTO: 95.2 FL (ref 81.4–97.8)
PLATELET # BLD AUTO: 412 K/UL (ref 164–446)
PMV BLD AUTO: 9 FL (ref 9–12.9)
POTASSIUM SERPL-SCNC: 4.5 MMOL/L (ref 3.6–5.5)
PROT SERPL-MCNC: 7.9 G/DL (ref 6–8.2)
RBC # BLD AUTO: 4.41 M/UL (ref 4.2–5.4)
SODIUM SERPL-SCNC: 137 MMOL/L (ref 135–145)
WBC # BLD AUTO: 10.2 K/UL (ref 4.8–10.8)

## 2024-01-15 PROCEDURE — 93005 ELECTROCARDIOGRAM TRACING: CPT

## 2024-01-15 PROCEDURE — 36415 COLL VENOUS BLD VENIPUNCTURE: CPT

## 2024-01-15 PROCEDURE — 93010 ELECTROCARDIOGRAM REPORT: CPT | Performed by: INTERNAL MEDICINE

## 2024-01-15 PROCEDURE — 80053 COMPREHEN METABOLIC PANEL: CPT

## 2024-01-15 PROCEDURE — 85027 COMPLETE CBC AUTOMATED: CPT

## 2024-01-16 ENCOUNTER — APPOINTMENT (OUTPATIENT)
Dept: RADIOLOGY | Facility: MEDICAL CENTER | Age: 69
End: 2024-01-16
Attending: INTERNAL MEDICINE
Payer: MEDICARE

## 2024-01-22 ENCOUNTER — ANESTHESIA EVENT (OUTPATIENT)
Dept: SURGERY | Facility: MEDICAL CENTER | Age: 69
DRG: 163 | End: 2024-01-22
Payer: MEDICARE

## 2024-01-22 ENCOUNTER — APPOINTMENT (OUTPATIENT)
Dept: RADIOLOGY | Facility: MEDICAL CENTER | Age: 69
DRG: 163 | End: 2024-01-22
Attending: STUDENT IN AN ORGANIZED HEALTH CARE EDUCATION/TRAINING PROGRAM
Payer: MEDICARE

## 2024-01-22 ENCOUNTER — ANESTHESIA (OUTPATIENT)
Dept: SURGERY | Facility: MEDICAL CENTER | Age: 69
DRG: 163 | End: 2024-01-22
Payer: MEDICARE

## 2024-01-22 ENCOUNTER — HOSPITAL ENCOUNTER (INPATIENT)
Facility: MEDICAL CENTER | Age: 69
LOS: 3 days | DRG: 163 | End: 2024-01-25
Attending: STUDENT IN AN ORGANIZED HEALTH CARE EDUCATION/TRAINING PROGRAM | Admitting: STUDENT IN AN ORGANIZED HEALTH CARE EDUCATION/TRAINING PROGRAM
Payer: MEDICARE

## 2024-01-22 DIAGNOSIS — R06.02 SHORTNESS OF BREATH: ICD-10-CM

## 2024-01-22 DIAGNOSIS — J94.1 FIBROTHORAX: ICD-10-CM

## 2024-01-22 PROCEDURE — 700111 HCHG RX REV CODE 636 W/ 250 OVERRIDE (IP): Performed by: ANESTHESIOLOGY

## 2024-01-22 PROCEDURE — 700102 HCHG RX REV CODE 250 W/ 637 OVERRIDE(OP): Performed by: ANESTHESIOLOGY

## 2024-01-22 PROCEDURE — 87116 MYCOBACTERIA CULTURE: CPT

## 2024-01-22 PROCEDURE — 160035 HCHG PACU - 1ST 60 MINS PHASE I: Performed by: STUDENT IN AN ORGANIZED HEALTH CARE EDUCATION/TRAINING PROGRAM

## 2024-01-22 PROCEDURE — 700105 HCHG RX REV CODE 258: Performed by: STUDENT IN AN ORGANIZED HEALTH CARE EDUCATION/TRAINING PROGRAM

## 2024-01-22 PROCEDURE — 87206 SMEAR FLUORESCENT/ACID STAI: CPT

## 2024-01-22 PROCEDURE — 700101 HCHG RX REV CODE 250: Performed by: STUDENT IN AN ORGANIZED HEALTH CARE EDUCATION/TRAINING PROGRAM

## 2024-01-22 PROCEDURE — A9270 NON-COVERED ITEM OR SERVICE: HCPCS | Performed by: ANESTHESIOLOGY

## 2024-01-22 PROCEDURE — 160002 HCHG RECOVERY MINUTES (STAT): Performed by: STUDENT IN AN ORGANIZED HEALTH CARE EDUCATION/TRAINING PROGRAM

## 2024-01-22 PROCEDURE — 160036 HCHG PACU - EA ADDL 30 MINS PHASE I: Performed by: STUDENT IN AN ORGANIZED HEALTH CARE EDUCATION/TRAINING PROGRAM

## 2024-01-22 PROCEDURE — 160048 HCHG OR STATISTICAL LEVEL 1-5: Performed by: STUDENT IN AN ORGANIZED HEALTH CARE EDUCATION/TRAINING PROGRAM

## 2024-01-22 PROCEDURE — 87075 CULTR BACTERIA EXCEPT BLOOD: CPT

## 2024-01-22 PROCEDURE — 770001 HCHG ROOM/CARE - MED/SURG/GYN PRIV*

## 2024-01-22 PROCEDURE — 160029 HCHG SURGERY MINUTES - 1ST 30 MINS LEVEL 4: Performed by: STUDENT IN AN ORGANIZED HEALTH CARE EDUCATION/TRAINING PROGRAM

## 2024-01-22 PROCEDURE — 160009 HCHG ANES TIME/MIN: Performed by: STUDENT IN AN ORGANIZED HEALTH CARE EDUCATION/TRAINING PROGRAM

## 2024-01-22 PROCEDURE — 160041 HCHG SURGERY MINUTES - EA ADDL 1 MIN LEVEL 4: Performed by: STUDENT IN AN ORGANIZED HEALTH CARE EDUCATION/TRAINING PROGRAM

## 2024-01-22 PROCEDURE — 88305 TISSUE EXAM BY PATHOLOGIST: CPT

## 2024-01-22 PROCEDURE — 71045 X-RAY EXAM CHEST 1 VIEW: CPT

## 2024-01-22 PROCEDURE — 700101 HCHG RX REV CODE 250: Performed by: ANESTHESIOLOGY

## 2024-01-22 PROCEDURE — 0BND4ZZ RELEASE RIGHT MIDDLE LUNG LOBE, PERCUTANEOUS ENDOSCOPIC APPROACH: ICD-10-PCS | Performed by: STUDENT IN AN ORGANIZED HEALTH CARE EDUCATION/TRAINING PROGRAM

## 2024-01-22 PROCEDURE — 87070 CULTURE OTHR SPECIMN AEROBIC: CPT

## 2024-01-22 PROCEDURE — 0BNF4ZZ RELEASE RIGHT LOWER LUNG LOBE, PERCUTANEOUS ENDOSCOPIC APPROACH: ICD-10-PCS | Performed by: STUDENT IN AN ORGANIZED HEALTH CARE EDUCATION/TRAINING PROGRAM

## 2024-01-22 PROCEDURE — 87205 SMEAR GRAM STAIN: CPT

## 2024-01-22 PROCEDURE — 87015 SPECIMEN INFECT AGNT CONCNTJ: CPT | Mod: 91

## 2024-01-22 RX ORDER — KETOROLAC TROMETHAMINE 15 MG/ML
15 INJECTION, SOLUTION INTRAMUSCULAR; INTRAVENOUS EVERY 6 HOURS
Status: DISCONTINUED | OUTPATIENT
Start: 2024-01-23 | End: 2024-01-25 | Stop reason: HOSPADM

## 2024-01-22 RX ORDER — OXYCODONE HYDROCHLORIDE 10 MG/1
10 TABLET ORAL
Status: DISCONTINUED | OUTPATIENT
Start: 2024-01-22 | End: 2024-01-25 | Stop reason: HOSPADM

## 2024-01-22 RX ORDER — HYDROMORPHONE HYDROCHLORIDE 2 MG/ML
INJECTION, SOLUTION INTRAMUSCULAR; INTRAVENOUS; SUBCUTANEOUS PRN
Status: DISCONTINUED | OUTPATIENT
Start: 2024-01-22 | End: 2024-01-22 | Stop reason: SURG

## 2024-01-22 RX ORDER — OXYCODONE HCL 5 MG/5 ML
5 SOLUTION, ORAL ORAL
Status: DISCONTINUED | OUTPATIENT
Start: 2024-01-22 | End: 2024-01-22

## 2024-01-22 RX ORDER — DIPHENHYDRAMINE HCL 25 MG
25 TABLET ORAL EVERY 6 HOURS PRN
Status: DISCONTINUED | OUTPATIENT
Start: 2024-01-22 | End: 2024-01-25 | Stop reason: HOSPADM

## 2024-01-22 RX ORDER — PHENYLEPHRINE HCL IN 0.9% NACL 0.5 MG/5ML
SYRINGE (ML) INTRAVENOUS PRN
Status: DISCONTINUED | OUTPATIENT
Start: 2024-01-22 | End: 2024-01-22 | Stop reason: SURG

## 2024-01-22 RX ORDER — SODIUM CHLORIDE, SODIUM LACTATE, POTASSIUM CHLORIDE, CALCIUM CHLORIDE 600; 310; 30; 20 MG/100ML; MG/100ML; MG/100ML; MG/100ML
INJECTION, SOLUTION INTRAVENOUS CONTINUOUS
Status: DISCONTINUED | OUTPATIENT
Start: 2024-01-22 | End: 2024-01-22

## 2024-01-22 RX ORDER — OXYCODONE HCL 5 MG/5 ML
10 SOLUTION, ORAL ORAL
Status: COMPLETED | OUTPATIENT
Start: 2024-01-22 | End: 2024-01-22

## 2024-01-22 RX ORDER — DEXAMETHASONE SODIUM PHOSPHATE 4 MG/ML
INJECTION, SOLUTION INTRA-ARTICULAR; INTRALESIONAL; INTRAMUSCULAR; INTRAVENOUS; SOFT TISSUE PRN
Status: DISCONTINUED | OUTPATIENT
Start: 2024-01-22 | End: 2024-01-22 | Stop reason: SURG

## 2024-01-22 RX ORDER — ROCURONIUM BROMIDE 10 MG/ML
INJECTION, SOLUTION INTRAVENOUS PRN
Status: DISCONTINUED | OUTPATIENT
Start: 2024-01-22 | End: 2024-01-22 | Stop reason: SURG

## 2024-01-22 RX ORDER — DIPHENHYDRAMINE HYDROCHLORIDE 50 MG/ML
25 INJECTION INTRAMUSCULAR; INTRAVENOUS EVERY 6 HOURS PRN
Status: DISCONTINUED | OUTPATIENT
Start: 2024-01-22 | End: 2024-01-25 | Stop reason: HOSPADM

## 2024-01-22 RX ORDER — SODIUM CHLORIDE, SODIUM LACTATE, POTASSIUM CHLORIDE, CALCIUM CHLORIDE 600; 310; 30; 20 MG/100ML; MG/100ML; MG/100ML; MG/100ML
INJECTION, SOLUTION INTRAVENOUS CONTINUOUS
Status: DISCONTINUED | OUTPATIENT
Start: 2024-01-22 | End: 2024-01-22 | Stop reason: HOSPADM

## 2024-01-22 RX ORDER — MIDAZOLAM HYDROCHLORIDE 1 MG/ML
INJECTION INTRAMUSCULAR; INTRAVENOUS PRN
Status: DISCONTINUED | OUTPATIENT
Start: 2024-01-22 | End: 2024-01-22 | Stop reason: SURG

## 2024-01-22 RX ORDER — ENOXAPARIN SODIUM 100 MG/ML
40 INJECTION SUBCUTANEOUS DAILY
Status: DISCONTINUED | OUTPATIENT
Start: 2024-01-23 | End: 2024-01-25 | Stop reason: HOSPADM

## 2024-01-22 RX ORDER — ONDANSETRON 2 MG/ML
4 INJECTION INTRAMUSCULAR; INTRAVENOUS
Status: DISCONTINUED | OUTPATIENT
Start: 2024-01-22 | End: 2024-01-22 | Stop reason: HOSPADM

## 2024-01-22 RX ORDER — OXYCODONE HCL 5 MG/5 ML
5 SOLUTION, ORAL ORAL
Status: COMPLETED | OUTPATIENT
Start: 2024-01-22 | End: 2024-01-22

## 2024-01-22 RX ORDER — SODIUM CHLORIDE, SODIUM LACTATE, POTASSIUM CHLORIDE, CALCIUM CHLORIDE 600; 310; 30; 20 MG/100ML; MG/100ML; MG/100ML; MG/100ML
INJECTION, SOLUTION INTRAVENOUS CONTINUOUS
Status: DISCONTINUED | OUTPATIENT
Start: 2024-01-22 | End: 2024-01-23

## 2024-01-22 RX ORDER — OXYCODONE HYDROCHLORIDE 5 MG/1
5 TABLET ORAL
Status: DISCONTINUED | OUTPATIENT
Start: 2024-01-22 | End: 2024-01-25 | Stop reason: HOSPADM

## 2024-01-22 RX ORDER — DIPHENHYDRAMINE HYDROCHLORIDE 50 MG/ML
12.5 INJECTION INTRAMUSCULAR; INTRAVENOUS
Status: DISCONTINUED | OUTPATIENT
Start: 2024-01-22 | End: 2024-01-22

## 2024-01-22 RX ORDER — ONDANSETRON 2 MG/ML
INJECTION INTRAMUSCULAR; INTRAVENOUS PRN
Status: DISCONTINUED | OUTPATIENT
Start: 2024-01-22 | End: 2024-01-22 | Stop reason: SURG

## 2024-01-22 RX ORDER — HYDROMORPHONE HYDROCHLORIDE 1 MG/ML
0.5 INJECTION, SOLUTION INTRAMUSCULAR; INTRAVENOUS; SUBCUTANEOUS
Status: DISCONTINUED | OUTPATIENT
Start: 2024-01-22 | End: 2024-01-25 | Stop reason: HOSPADM

## 2024-01-22 RX ORDER — OXYCODONE HCL 5 MG/5 ML
10 SOLUTION, ORAL ORAL
Status: DISCONTINUED | OUTPATIENT
Start: 2024-01-22 | End: 2024-01-22

## 2024-01-22 RX ORDER — CEFAZOLIN SODIUM 1 G/3ML
INJECTION, POWDER, FOR SOLUTION INTRAMUSCULAR; INTRAVENOUS PRN
Status: DISCONTINUED | OUTPATIENT
Start: 2024-01-22 | End: 2024-01-22 | Stop reason: SURG

## 2024-01-22 RX ORDER — IBUPROFEN 800 MG/1
800 TABLET ORAL 3 TIMES DAILY PRN
Status: DISCONTINUED | OUTPATIENT
Start: 2024-01-26 | End: 2024-01-25 | Stop reason: HOSPADM

## 2024-01-22 RX ORDER — AMOXICILLIN 250 MG
1 CAPSULE ORAL 2 TIMES DAILY
Status: DISCONTINUED | OUTPATIENT
Start: 2024-01-22 | End: 2024-01-25 | Stop reason: HOSPADM

## 2024-01-22 RX ORDER — LIDOCAINE HYDROCHLORIDE 40 MG/ML
SOLUTION TOPICAL PRN
Status: DISCONTINUED | OUTPATIENT
Start: 2024-01-22 | End: 2024-01-22 | Stop reason: SURG

## 2024-01-22 RX ORDER — KETOROLAC TROMETHAMINE 15 MG/ML
15 INJECTION, SOLUTION INTRAMUSCULAR; INTRAVENOUS ONCE
Status: COMPLETED | OUTPATIENT
Start: 2024-01-22 | End: 2024-01-22

## 2024-01-22 RX ORDER — BUPIVACAINE HYDROCHLORIDE AND EPINEPHRINE 5; 5 MG/ML; UG/ML
INJECTION, SOLUTION EPIDURAL; INTRACAUDAL; PERINEURAL
Status: DISCONTINUED | OUTPATIENT
Start: 2024-01-22 | End: 2024-01-22 | Stop reason: HOSPADM

## 2024-01-22 RX ORDER — HALOPERIDOL 5 MG/ML
1 INJECTION INTRAMUSCULAR
Status: DISCONTINUED | OUTPATIENT
Start: 2024-01-22 | End: 2024-01-22 | Stop reason: HOSPADM

## 2024-01-22 RX ORDER — DIPHENHYDRAMINE HYDROCHLORIDE 50 MG/ML
12.5 INJECTION INTRAMUSCULAR; INTRAVENOUS
Status: DISCONTINUED | OUTPATIENT
Start: 2024-01-22 | End: 2024-01-22 | Stop reason: HOSPADM

## 2024-01-22 RX ORDER — EPHEDRINE SULFATE 50 MG/ML
INJECTION, SOLUTION INTRAVENOUS PRN
Status: DISCONTINUED | OUTPATIENT
Start: 2024-01-22 | End: 2024-01-22 | Stop reason: SURG

## 2024-01-22 RX ORDER — HALOPERIDOL 5 MG/ML
1 INJECTION INTRAMUSCULAR
Status: DISCONTINUED | OUTPATIENT
Start: 2024-01-22 | End: 2024-01-22

## 2024-01-22 RX ORDER — POLYETHYLENE GLYCOL 3350 17 G/17G
1 POWDER, FOR SOLUTION ORAL DAILY
Status: DISCONTINUED | OUTPATIENT
Start: 2024-01-22 | End: 2024-01-25 | Stop reason: HOSPADM

## 2024-01-22 RX ORDER — ONDANSETRON 2 MG/ML
4 INJECTION INTRAMUSCULAR; INTRAVENOUS EVERY 4 HOURS PRN
Status: DISCONTINUED | OUTPATIENT
Start: 2024-01-22 | End: 2024-01-25 | Stop reason: HOSPADM

## 2024-01-22 RX ORDER — ONDANSETRON 2 MG/ML
4 INJECTION INTRAMUSCULAR; INTRAVENOUS
Status: DISCONTINUED | OUTPATIENT
Start: 2024-01-22 | End: 2024-01-22

## 2024-01-22 RX ADMIN — SODIUM CHLORIDE, POTASSIUM CHLORIDE, SODIUM LACTATE AND CALCIUM CHLORIDE: 600; 310; 30; 20 INJECTION, SOLUTION INTRAVENOUS at 15:58

## 2024-01-22 RX ADMIN — CEFAZOLIN 2 G: 1 INJECTION, POWDER, FOR SOLUTION INTRAMUSCULAR; INTRAVENOUS at 14:43

## 2024-01-22 RX ADMIN — HYDROMORPHONE HYDROCHLORIDE 1 MG: 2 INJECTION INTRAMUSCULAR; INTRAVENOUS; SUBCUTANEOUS at 15:23

## 2024-01-22 RX ADMIN — Medication 200 MCG: at 15:04

## 2024-01-22 RX ADMIN — Medication 200 MCG: at 15:52

## 2024-01-22 RX ADMIN — ROCURONIUM BROMIDE 40 MG: 50 INJECTION, SOLUTION INTRAVENOUS at 15:23

## 2024-01-22 RX ADMIN — DEXAMETHASONE SODIUM PHOSPHATE 8 MG: 4 INJECTION INTRA-ARTICULAR; INTRALESIONAL; INTRAMUSCULAR; INTRAVENOUS; SOFT TISSUE at 14:52

## 2024-01-22 RX ADMIN — OXYCODONE HYDROCHLORIDE 10 MG: 5 SOLUTION ORAL at 17:23

## 2024-01-22 RX ADMIN — ROCURONIUM BROMIDE 50 MG: 50 INJECTION, SOLUTION INTRAVENOUS at 14:43

## 2024-01-22 RX ADMIN — HYDROMORPHONE HYDROCHLORIDE 1 MG: 2 INJECTION INTRAMUSCULAR; INTRAVENOUS; SUBCUTANEOUS at 14:43

## 2024-01-22 RX ADMIN — Medication 100 MCG: at 16:07

## 2024-01-22 RX ADMIN — SUGAMMADEX 200 MG: 100 INJECTION, SOLUTION INTRAVENOUS at 16:49

## 2024-01-22 RX ADMIN — ONDANSETRON 4 MG: 2 INJECTION INTRAMUSCULAR; INTRAVENOUS at 16:49

## 2024-01-22 RX ADMIN — Medication 200 MCG: at 14:53

## 2024-01-22 RX ADMIN — LIDOCAINE HYDROCHLORIDE 4 ML: 40 SOLUTION TOPICAL at 14:45

## 2024-01-22 RX ADMIN — SODIUM CHLORIDE, POTASSIUM CHLORIDE, SODIUM LACTATE AND CALCIUM CHLORIDE: 600; 310; 30; 20 INJECTION, SOLUTION INTRAVENOUS at 13:18

## 2024-01-22 RX ADMIN — SODIUM CHLORIDE, POTASSIUM CHLORIDE, SODIUM LACTATE AND CALCIUM CHLORIDE: 600; 310; 30; 20 INJECTION, SOLUTION INTRAVENOUS at 15:06

## 2024-01-22 RX ADMIN — MIDAZOLAM HYDROCHLORIDE 2 MG: 1 INJECTION, SOLUTION INTRAMUSCULAR; INTRAVENOUS at 14:43

## 2024-01-22 RX ADMIN — SODIUM CHLORIDE, POTASSIUM CHLORIDE, SODIUM LACTATE AND CALCIUM CHLORIDE: 600; 310; 30; 20 INJECTION, SOLUTION INTRAVENOUS at 20:02

## 2024-01-22 RX ADMIN — PROPOFOL 150 MG: 10 INJECTION, EMULSION INTRAVENOUS at 14:43

## 2024-01-22 RX ADMIN — EPHEDRINE SULFATE 10 MG: 50 INJECTION, SOLUTION INTRAVENOUS at 16:27

## 2024-01-22 RX ADMIN — KETOROLAC TROMETHAMINE 15 MG: 15 INJECTION, SOLUTION INTRAMUSCULAR; INTRAVENOUS at 17:23

## 2024-01-22 RX ADMIN — EPHEDRINE SULFATE 10 MG: 50 INJECTION, SOLUTION INTRAVENOUS at 16:07

## 2024-01-22 RX ADMIN — Medication 100 MCG: at 16:27

## 2024-01-22 ASSESSMENT — FIBROSIS 4 INDEX: FIB4 SCORE: 0.52

## 2024-01-22 ASSESSMENT — PAIN DESCRIPTION - PAIN TYPE: TYPE: ACUTE PAIN

## 2024-01-22 NOTE — ANESTHESIA PREPROCEDURE EVALUATION
Case: [id number] Date/Time: 01/22/24 1515    Procedure: THORACOSCOPY WITH DECORTICATION AND POSSIBLE TALC PLEURODESIS    Pre-op diagnosis: PLEURAL EFFUSION    Location: [facility] OR 11 / SURGERY [facility]    Surgeons: Rolan Bonds M.D.            Relevant Problems   PULMONARY   (positive) Acute exacerbation of chronic obstructive pulmonary disease (COPD) (HCC)   (positive) Shortness of breath       Physical Exam    Airway   Mallampati: II  TM distance: >3 FB  Neck ROM: full       Cardiovascular - normal exam  Rhythm: regular  Rate: normal  (-) murmur     Dental - normal exam           Pulmonary - normal exam  (+) decreased breath sounds     Abdominal    Neurological - normal exam                   Anesthesia Plan    ASA 3   ASA physical status 3 criteria: COPD - poorly controlled    Plan - general       Airway plan will be ETT        Plan Factors:   Patient was previously instructed to abstain from smoking on day of procedure.  Patient did not smoke on day of procedure.      Induction: intravenous    Postoperative Plan: Postoperative administration of opioids is intended.    Pertinent diagnostic labs and testing reviewed    Informed Consent:    Anesthetic plan and risks discussed with patient.    Use of blood products discussed with: patient whom consented to blood products.

## 2024-01-23 ENCOUNTER — APPOINTMENT (OUTPATIENT)
Dept: RADIOLOGY | Facility: MEDICAL CENTER | Age: 69
DRG: 163 | End: 2024-01-23
Attending: STUDENT IN AN ORGANIZED HEALTH CARE EDUCATION/TRAINING PROGRAM
Payer: MEDICARE

## 2024-01-23 LAB
ANION GAP SERPL CALC-SCNC: 9 MMOL/L (ref 7–16)
BUN SERPL-MCNC: 9 MG/DL (ref 8–22)
CALCIUM SERPL-MCNC: 8.7 MG/DL (ref 8.5–10.5)
CHLORIDE SERPL-SCNC: 99 MMOL/L (ref 96–112)
CO2 SERPL-SCNC: 24 MMOL/L (ref 20–33)
CREAT SERPL-MCNC: 0.44 MG/DL (ref 0.5–1.4)
ERYTHROCYTE [DISTWIDTH] IN BLOOD BY AUTOMATED COUNT: 44.7 FL (ref 35.9–50)
GFR SERPLBLD CREATININE-BSD FMLA CKD-EPI: 105 ML/MIN/1.73 M 2
GLUCOSE SERPL-MCNC: 134 MG/DL (ref 65–99)
GRAM STN SPEC: NORMAL
HCT VFR BLD AUTO: 32.6 % (ref 37–47)
HCT VFR BLD AUTO: 35.1 % (ref 37–47)
HGB BLD-MCNC: 11.4 G/DL (ref 12–16)
HGB BLD-MCNC: 12.2 G/DL (ref 12–16)
MCH RBC QN AUTO: 33.6 PG (ref 27–33)
MCHC RBC AUTO-ENTMCNC: 35 G/DL (ref 32.2–35.5)
MCV RBC AUTO: 96.2 FL (ref 81.4–97.8)
PATHOLOGY CONSULT NOTE: NORMAL
PLATELET # BLD AUTO: 342 K/UL (ref 164–446)
PMV BLD AUTO: 9.2 FL (ref 9–12.9)
POTASSIUM SERPL-SCNC: 4.2 MMOL/L (ref 3.6–5.5)
RBC # BLD AUTO: 3.39 M/UL (ref 4.2–5.4)
RHODAMINE-AURAMINE STN SPEC: NORMAL
SIGNIFICANT IND 70042: NORMAL
SIGNIFICANT IND 70042: NORMAL
SITE SITE: NORMAL
SITE SITE: NORMAL
SODIUM SERPL-SCNC: 132 MMOL/L (ref 135–145)
SOURCE SOURCE: NORMAL
SOURCE SOURCE: NORMAL
WBC # BLD AUTO: 15.5 K/UL (ref 4.8–10.8)

## 2024-01-23 PROCEDURE — 85018 HEMOGLOBIN: CPT

## 2024-01-23 PROCEDURE — 85027 COMPLETE CBC AUTOMATED: CPT

## 2024-01-23 PROCEDURE — 71045 X-RAY EXAM CHEST 1 VIEW: CPT

## 2024-01-23 PROCEDURE — 80048 BASIC METABOLIC PNL TOTAL CA: CPT

## 2024-01-23 PROCEDURE — 85014 HEMATOCRIT: CPT

## 2024-01-23 PROCEDURE — 770001 HCHG ROOM/CARE - MED/SURG/GYN PRIV*

## 2024-01-23 PROCEDURE — 36415 COLL VENOUS BLD VENIPUNCTURE: CPT

## 2024-01-23 PROCEDURE — 700102 HCHG RX REV CODE 250 W/ 637 OVERRIDE(OP): Performed by: STUDENT IN AN ORGANIZED HEALTH CARE EDUCATION/TRAINING PROGRAM

## 2024-01-23 PROCEDURE — A9270 NON-COVERED ITEM OR SERVICE: HCPCS | Performed by: STUDENT IN AN ORGANIZED HEALTH CARE EDUCATION/TRAINING PROGRAM

## 2024-01-23 RX ADMIN — DOCUSATE SODIUM 50 MG AND SENNOSIDES 8.6 MG 1 TABLET: 8.6; 5 TABLET, FILM COATED ORAL at 17:24

## 2024-01-23 ASSESSMENT — LIFESTYLE VARIABLES
TOTAL SCORE: 0
EVER FELT BAD OR GUILTY ABOUT YOUR DRINKING: NO
HAVE YOU EVER FELT YOU SHOULD CUT DOWN ON YOUR DRINKING: NO
EVER HAD A DRINK FIRST THING IN THE MORNING TO STEADY YOUR NERVES TO GET RID OF A HANGOVER: NO
HAVE PEOPLE ANNOYED YOU BY CRITICIZING YOUR DRINKING: NO
DOES PATIENT WANT TO STOP DRINKING: NO
CONSUMPTION TOTAL: POSITIVE
HOW MANY TIMES IN THE PAST YEAR HAVE YOU HAD 5 OR MORE DRINKS IN A DAY: 0
ON A TYPICAL DAY WHEN YOU DRINK ALCOHOL HOW MANY DRINKS DO YOU HAVE: 2
AVERAGE NUMBER OF DAYS PER WEEK YOU HAVE A DRINK CONTAINING ALCOHOL: 5
ALCOHOL_USE: YES
TOTAL SCORE: 0
TOTAL SCORE: 0

## 2024-01-23 ASSESSMENT — PAIN DESCRIPTION - PAIN TYPE
TYPE: ACUTE PAIN

## 2024-01-23 ASSESSMENT — COGNITIVE AND FUNCTIONAL STATUS - GENERAL
MOBILITY SCORE: 24
SUGGESTED CMS G CODE MODIFIER MOBILITY: CH
DAILY ACTIVITIY SCORE: 24
SUGGESTED CMS G CODE MODIFIER DAILY ACTIVITY: CH

## 2024-01-23 ASSESSMENT — PATIENT HEALTH QUESTIONNAIRE - PHQ9
1. LITTLE INTEREST OR PLEASURE IN DOING THINGS: NOT AT ALL
SUM OF ALL RESPONSES TO PHQ9 QUESTIONS 1 AND 2: 0
2. FEELING DOWN, DEPRESSED, IRRITABLE, OR HOPELESS: NOT AT ALL

## 2024-01-23 NOTE — PROGRESS NOTES
"LYNN Granger was assisting pt to bathroom and waited for pt to sit on toilet before leaving the bathroom. Pt was frustrated that Bárbara waited for her to sit on toilet before leaving bathroom. Bárbara explained to pt that this is our policy. Pt was not agreeable. Pt then explained to this RN that, \"I don't want that CNA taking me to the bathroom anymore\". This RN reinforced our policy and that it is to ensure pt safety. Pt was not agreeable to education and still requesting to not have Bárbara assist her anymore. Charge RN notified.   "

## 2024-01-23 NOTE — ANESTHESIA POSTPROCEDURE EVALUATION
Patient: Cyndee Mosley    Procedure Summary       Date: 01/22/24 Room / Location: [place] [address] / SURGERY [facility]    Anesthesia Start: 1441 Anesthesia Stop: 1716    Procedure: THORACOSCOPY WITH DECORTICATION (Chest) Diagnosis: (PLEURAL EFFUSION)    Surgeons: Rolan Bonds M.D. Responsible Provider: Aries Duarte M.D.    Anesthesia Type: general ASA Status: 3            Final Anesthesia Type: general  Last vitals  BP   Blood Pressure : 128/66    Temp   36.2 °C (97.1 °F)    Pulse   90   Resp   20    SpO2   95 %      Anesthesia Post Evaluation    Patient location during evaluation: PACU  Patient participation: waiting for patient participation  Level of consciousness: obtunded/minimal responses    Airway patency: patent  Anesthetic complications: no  Cardiovascular status: hemodynamically stable  Respiratory status: acceptable  Hydration status: euvolemic    PONV: none          No notable events documented.     Nurse Pain Score: 0 (NPRS)

## 2024-01-23 NOTE — CARE PLAN
The patient is Stable - Low risk of patient condition declining or worsening    Shift Goals  Clinical Goals: monitor chest tube, OOB activity  Patient Goals: ambulate, POC updates    Progress made toward(s) clinical / shift goals:      Patient's chest tube will remain patent with frequent site assessments, pulmonary hygiene, and assessing for air leak/proper function for duration of shift.    Problem: Chest Tube Management  Goal: Complications related to chest tube will be avoided or minimized  Outcome: Progressing

## 2024-01-23 NOTE — PROGRESS NOTES
Bedside report received.  Assessment complete.  A&O x 4. Patient calls appropriately.  Patient ambulates with standby assist. Bed alarm off.   Patient has 2/10 pain. Pain managed with prescribed medications.  Denies N&V. Tolerating regular diet.  Surgical R chest tube to -20 wall suction.  + void, + flatus, - BM.  Patient denies SOB.  SCD's refused. Lovenox refused at this time; education provided.  Review plan with of care with patient. Call light and personal belongings within reach. Hourly rounding in place. All needs met at this time.

## 2024-01-23 NOTE — ANESTHESIA TIME REPORT
Anesthesia Start and Stop Event Times       Date Time Event    1/22/2024 1433 Ready for Procedure     1441 Anesthesia Start     1716 Anesthesia Stop          Responsible Staff  01/22/24      Name Role Begin End    Aries Duarte M.D. Anesth 1441 1716          Overtime Reason:  no overtime (within assigned shift)    Comments:

## 2024-01-23 NOTE — CARE PLAN
The patient is Stable - Low risk of patient condition declining or worsening    Shift Goals  Clinical Goals: [place] to unit and CT management  Patient Goals: Rest    Progress made toward(s) clinical / shift goals:  Pt oriented to unit and verbalized understanding on use of call light, pain meds, and unit expectations. Intermittently monitoring CT and output. CT dressing changed per protocol. Pt slept intermittently throughout shift.     Problem: Knowledge Deficit - Standard  Goal: Patient and family/care givers will demonstrate understanding of plan of care, disease process/condition, diagnostic tests and medications  Outcome: Progressing     Problem: Chest Tube Management  Goal: Complications related to chest tube will be avoided or minimized  Outcome: Progressing

## 2024-01-23 NOTE — PROGRESS NOTES
"Ms. Cyndee Mosley is a 68 y.o. female who presented to the hospital with a recurrent right pleural effusion and fibrothorax now status post right thoracoscopic decortication on 1/22/2024    S: She has minimal pain today, she is tolerating diet without nausea or vomiting.  She is up ambulating in the room.  She is breathing comfortably on room air.  I-S 1000 mL    O:  BP 93/74   Pulse 99   Temp 36.2 °C (97.2 °F) (Temporal)   Resp 19   Ht 1.549 m (5' 1\")   Wt 53.3 kg (117 lb 8.1 oz)   SpO2 93%   BMI 22.20 kg/m²     GEN: awake, alert oriented  CV: RRR, brisk cap refill on hands  RESP: no labored breathing, chest tube with serosanguineous output approximately 450 mL overnight, no airleak  ABD: soft, nontender, nondistended    Recent Labs     01/23/24  0555   WBC 15.5*   RBC 3.39*   HEMOGLOBIN 11.4*   HEMATOCRIT 32.6*   MCV 96.2   MCH 33.6*   RDW 44.7   PLATELETCT 342   MPV 9.2     Recent Labs     01/23/24  0555   SODIUM 132*   POTASSIUM 4.2   CHLORIDE 99   CO2 24   GLUCOSE 134*   BUN 9     INR   Date Value Ref Range Status   10/04/2023 1.07 0.87 - 1.13 Final     Comment:     INR - Non-therapeutic Reference Range: 0.87-1.13  INR - Therapeutic Reference Range: 2.0-4.0         Imaging: Chest x-ray with a small amount of subcutaneous emphysema and small amount of right pleural effusion    A/P:   Ms. Cyndee Mosley is a 68 y.o. female who presented to the hospital with a recurrent right pleural effusion and fibrothorax now status post right thoracoscopic decortication on 1/22/2024    -Chest tube to -20 cm H2O suction  Ambulate in hallways on waterseal and placed back to suction while in room  Respiratory toilet with ambulation, sitting up in chair, incentive spirometry  Pain control with oxycodone, Tylenol, IV Dilaudid  Bowel regimen with docusate, senna, MiraLAX  Will assess chest tube for removal tomorrow morning.    Rolan Bonds MD  Thoracic & General Surgeon  [facility]  [phone]      "

## 2024-01-23 NOTE — PROGRESS NOTES
4 Eyes Skin Assessment Completed by Zelda, RN and Velma, RN.    Head WDL  Ears WDL  Nose WDL  Mouth WDL  Neck WDL  Breast/Chest WDL  Shoulder Blades WDL  Spine WDL  (R) Arm/Elbow/Hand WDL  (L) Arm/Elbow/Hand WDL  Abdomen R flank CT  Groin WDL  Scrotum/Coccyx/Buttocks WDL  (R) Leg WDL  (L) Leg WDL  (R) Heel/Foot/Toe WDL  (L) Heel/Foot/Toe WDL          Devices In Places Blood Pressure Cuff, Pulse Ox, SCD's, and Oxy Mask      Interventions In Place Pillows    Possible Skin Injury No    Pictures Uploaded Into Epic N/A  Wound Consult Placed N/A  RN Wound Prevention Protocol Ordered No

## 2024-01-23 NOTE — PROGRESS NOTES
Bedside report received, assessment completed    A&O x  4, pt calls appropriately  Mobility: Up with SBA  Fall Risk Assessment: No risk, door notifications in use  Pain Assessment / Reassessment completed, medication provided per MAR  Diet: Regular  LDA:   IV Access: 18 L wrist, CDI/ flushed/ Infusing LR at 125 mL/hr  CT: R flank    GI/: - void, - flatus, PTA BM  DVT Prophylaxis: Lovenox, SCD on    Reviewed plan of care with patient, bed in lowest position and locked, pt resting comfortably now, call light within reach, all needs met at this time. Interventions will be executed per plan of care

## 2024-01-23 NOTE — OP REPORT
THORACIC SURGERY OPERATIVE REPORT      DATE OF OPERATION:    01/22/24    PREOPERATIVE DIAGNOSIS:    Right chest fibrothorax  Right recurrent pleural effusion    POSTOPERATIVE DIAGNOSIS:   same    PROCEDURE PERFORMED:   1) Bronchoscopy (CPT 52593)  2) Right thoracoscopic total decortication (CPT 74135)    SURGEON:      Rolan Bonds M.D.    ASSISTANT:      John H. Ganser, M.D.    ANESTHESIOLOGIST:    Aries Duarte M.D.    ANESTHESIA:     Double lumen endotracheal tube general anesthesia.    INDICATIONS:   Ms. Cyndee Mosley is a 68 y.o.  female with right recurrent pleural effusion. After discussion of the risks and benefits of surgery, he is agreeable to proceed to the operating room for a total decortication.    The complexity of the surgical procedure necessitated additional skilled operative assistance from another surgeon. The assistant was present during the entire operation. The surgical assistant performed the following: provided assistance with optimal surgical exposure of the operative field, provided high complexity, subspecialty decision making input, and assisted with the surgical decortication.    FINDINGS:   There was a chronic right pleural effusion with a thick fibrinous rind that was densely adherent to the right lower lobe and right middle lobe with fibrinous exudative fluid inside the cavity.  The rind was removed from the chest wall diaphragm and lung with good expansion of the lung at the end of the case.  There was an air leak in the chest tube at the end of the case however this was limited to 1 chamber while she was ventilated.    WOUND CLASSIFICATION:    Class III, contaminated.    SPECIMEN:       Pleural tissue sent for cultures.  Pleural rind sent for permanent pathology    ESTIMATED BLOOD LOSS:    200 mL.    DESCRIPTION OF PROCEDURE:   The patient was brought to the operating room and transferred to the operating table. General anesthesia was begun and the lung was isolated  using a double lumen tube. The patient was then positioned in the left lateral decubitus position. The right chest was prepped and draped in the normal sterile fashion.     A bronchoscopy was performed while placing the double-lumen tube.  The tube was correctly positioned into the left mainstem.  There were no lesions located in the right or left mainstem bronchus, the lobar bronchi, or in the segmental bronchi on either side. There were no significant secretions on either side.    Local anesthetic was injected around the anterior 7th intercostal space. An incision was made just above the rib and a trocar inserted into the chest. Blunt dissection was used to find the posterior 8th intercostal space. Local anesthetic was injected and an incision was made creating a posterior port site.     These ports were placed into the fluid cavity that was surrounded by the thick pleural rind.  I then placed an additional anterior inferior port.  Using blunt dissection I was able to remove the parietal pleura off the chest wall onto the edge of the fluid cavity.  I was then able to dissect through the pleural rind into the pleural space I was able to free the inferior edge of the upper lobe from the rind and carried this dissection posteriorly freeing up the superior portion of the lower lobe and fissure from the rind.  Using a combination of blunt and electrocautery dissection, the rind and parietal pleura was taken down from the chest wall. Once this was adequately taken down to allow working space, we were able to free an edge of the rind from the lung and in a piecemeal fashion were able to free the lung from the rind on the superior and posterior portions first and then freed the lung from the lateral and anterior portions of the right lower and right middle lobes.  We are then able to free the lung from its adhesions to the diaphragm.  At this point we performed an inflation test of the lung.  There was good inflation of  the lung however there was still some adhesions anteriorly and posterior the limiting the expansion of the lung.  The lung was then desufflated these posterior adhesions were taken down freeing the lung from the chest wall posteriorly.  The anterior adhesions were then taken down with blunt and electrocautery giving the lung freedom from the anterior chest wall.  The lung was then reinflated with good expansion of the lung.  There were noted to be air leaks and several places on the lower and middle lobes.  Using Vistaseal these areas were covered.  The lung cavity was cleared of fluid.  We obtained adequate hemostasis with electrocautery.  A 24 Yemeni chest tube was then inserted from the lateral port toward the apex and secured to the skin using 0 silk suture.  The lung was then inflated under visualization with good expansion.  Skin incisions were closed with a deep Vicryl and subcuticular Monocryl suture. Dermabond was applied to the skin. All counts were correct x 2. The patient was positioned supine and emerged from general anesthesia then taken to the recovery room in good condition. There were no complications with the case.    Rolan Bonds MD  Thoracic & General Surgeon  [facility]  [phone]

## 2024-01-24 ENCOUNTER — APPOINTMENT (OUTPATIENT)
Dept: RADIOLOGY | Facility: MEDICAL CENTER | Age: 69
DRG: 163 | End: 2024-01-24
Attending: STUDENT IN AN ORGANIZED HEALTH CARE EDUCATION/TRAINING PROGRAM
Payer: MEDICARE

## 2024-01-24 LAB
ANION GAP SERPL CALC-SCNC: 9 MMOL/L (ref 7–16)
BASOPHILS # BLD AUTO: 0.2 % (ref 0–1.8)
BASOPHILS # BLD: 0.03 K/UL (ref 0–0.12)
BUN SERPL-MCNC: 9 MG/DL (ref 8–22)
CALCIUM SERPL-MCNC: 8.3 MG/DL (ref 8.5–10.5)
CHLORIDE SERPL-SCNC: 99 MMOL/L (ref 96–112)
CO2 SERPL-SCNC: 24 MMOL/L (ref 20–33)
CREAT SERPL-MCNC: 0.47 MG/DL (ref 0.5–1.4)
EOSINOPHIL # BLD AUTO: 0.26 K/UL (ref 0–0.51)
EOSINOPHIL NFR BLD: 1.8 % (ref 0–6.9)
ERYTHROCYTE [DISTWIDTH] IN BLOOD BY AUTOMATED COUNT: 46.3 FL (ref 35.9–50)
GFR SERPLBLD CREATININE-BSD FMLA CKD-EPI: 103 ML/MIN/1.73 M 2
GLUCOSE SERPL-MCNC: 97 MG/DL (ref 65–99)
HCT VFR BLD AUTO: 33.3 % (ref 37–47)
HGB BLD-MCNC: 11.3 G/DL (ref 12–16)
IMM GRANULOCYTES # BLD AUTO: 0.04 K/UL (ref 0–0.11)
IMM GRANULOCYTES NFR BLD AUTO: 0.3 % (ref 0–0.9)
LYMPHOCYTES # BLD AUTO: 3.02 K/UL (ref 1–4.8)
LYMPHOCYTES NFR BLD: 21.4 % (ref 22–41)
MAGNESIUM SERPL-MCNC: 1.9 MG/DL (ref 1.5–2.5)
MCH RBC QN AUTO: 32.8 PG (ref 27–33)
MCHC RBC AUTO-ENTMCNC: 33.9 G/DL (ref 32.2–35.5)
MCV RBC AUTO: 96.8 FL (ref 81.4–97.8)
MONOCYTES # BLD AUTO: 1.46 K/UL (ref 0–0.85)
MONOCYTES NFR BLD AUTO: 10.3 % (ref 0–13.4)
NEUTROPHILS # BLD AUTO: 9.33 K/UL (ref 1.82–7.42)
NEUTROPHILS NFR BLD: 66 % (ref 44–72)
NRBC # BLD AUTO: 0 K/UL
NRBC BLD-RTO: 0 /100 WBC (ref 0–0.2)
PHOSPHATE SERPL-MCNC: 2.4 MG/DL (ref 2.5–4.5)
PLATELET # BLD AUTO: 328 K/UL (ref 164–446)
PMV BLD AUTO: 9.2 FL (ref 9–12.9)
POTASSIUM SERPL-SCNC: 3.7 MMOL/L (ref 3.6–5.5)
RBC # BLD AUTO: 3.44 M/UL (ref 4.2–5.4)
SODIUM SERPL-SCNC: 132 MMOL/L (ref 135–145)
WBC # BLD AUTO: 14.1 K/UL (ref 4.8–10.8)

## 2024-01-24 PROCEDURE — A9270 NON-COVERED ITEM OR SERVICE: HCPCS | Performed by: STUDENT IN AN ORGANIZED HEALTH CARE EDUCATION/TRAINING PROGRAM

## 2024-01-24 PROCEDURE — 84100 ASSAY OF PHOSPHORUS: CPT

## 2024-01-24 PROCEDURE — 700102 HCHG RX REV CODE 250 W/ 637 OVERRIDE(OP): Performed by: STUDENT IN AN ORGANIZED HEALTH CARE EDUCATION/TRAINING PROGRAM

## 2024-01-24 PROCEDURE — 80048 BASIC METABOLIC PNL TOTAL CA: CPT

## 2024-01-24 PROCEDURE — 83735 ASSAY OF MAGNESIUM: CPT

## 2024-01-24 PROCEDURE — 85025 COMPLETE CBC W/AUTO DIFF WBC: CPT

## 2024-01-24 PROCEDURE — 700111 HCHG RX REV CODE 636 W/ 250 OVERRIDE (IP): Performed by: STUDENT IN AN ORGANIZED HEALTH CARE EDUCATION/TRAINING PROGRAM

## 2024-01-24 PROCEDURE — 71045 X-RAY EXAM CHEST 1 VIEW: CPT

## 2024-01-24 PROCEDURE — 770001 HCHG ROOM/CARE - MED/SURG/GYN PRIV*

## 2024-01-24 RX ORDER — FUROSEMIDE 10 MG/ML
20 INJECTION INTRAMUSCULAR; INTRAVENOUS ONCE
Status: COMPLETED | OUTPATIENT
Start: 2024-01-24 | End: 2024-01-24

## 2024-01-24 RX ORDER — ALBUTEROL SULFATE 90 UG/1
1-2 AEROSOL, METERED RESPIRATORY (INHALATION) EVERY 6 HOURS PRN
Status: DISCONTINUED | OUTPATIENT
Start: 2024-01-24 | End: 2024-01-25 | Stop reason: HOSPADM

## 2024-01-24 RX ADMIN — DIBASIC SODIUM PHOSPHATE, MONOBASIC POTASSIUM PHOSPHATE AND MONOBASIC SODIUM PHOSPHATE 500 MG: 852; 155; 130 TABLET ORAL at 10:11

## 2024-01-24 RX ADMIN — FUROSEMIDE 20 MG: 10 INJECTION, SOLUTION INTRAVENOUS at 10:11

## 2024-01-24 RX ADMIN — KETOROLAC TROMETHAMINE 15 MG: 15 INJECTION, SOLUTION INTRAMUSCULAR; INTRAVENOUS at 10:11

## 2024-01-24 RX ADMIN — KETOROLAC TROMETHAMINE 15 MG: 15 INJECTION, SOLUTION INTRAMUSCULAR; INTRAVENOUS at 05:40

## 2024-01-24 RX ADMIN — DIBASIC SODIUM PHOSPHATE, MONOBASIC POTASSIUM PHOSPHATE AND MONOBASIC SODIUM PHOSPHATE 500 MG: 852; 155; 130 TABLET ORAL at 16:45

## 2024-01-24 RX ADMIN — KETOROLAC TROMETHAMINE 15 MG: 15 INJECTION, SOLUTION INTRAMUSCULAR; INTRAVENOUS at 00:12

## 2024-01-24 RX ADMIN — DOCUSATE SODIUM 50 MG AND SENNOSIDES 8.6 MG 1 TABLET: 8.6; 5 TABLET, FILM COATED ORAL at 06:21

## 2024-01-24 RX ADMIN — DOCUSATE SODIUM 50 MG AND SENNOSIDES 8.6 MG 1 TABLET: 8.6; 5 TABLET, FILM COATED ORAL at 16:45

## 2024-01-24 ASSESSMENT — PAIN DESCRIPTION - PAIN TYPE
TYPE: ACUTE PAIN

## 2024-01-24 NOTE — CARE PLAN
The patient is Stable - Low risk of patient condition declining or worsening    Shift Goals  Clinical Goals: monitor BP, pulmonary hygiene, rest  Patient Goals: rest    Progress made toward(s) clinical / shift goals:  Pt declining need for PRN pain intervention. Ice packs provided to pt for comfort. CT assessed and patent. Resting at this time. BP taken on leg per provider instruction to dayshift RN.     Problem: Knowledge Deficit - Standard  Goal: Patient and family/care givers will demonstrate understanding of plan of care, disease process/condition, diagnostic tests and medications  Description: Target End Date:  1-3 days or as soon as patient condition allows    Document in Patient Education    1.  Patient and family/caregiver oriented to unit, equipment, visitation policy and means for communicating concern  2.  Complete/review Learning Assessment  3.  Assess knowledge level of disease process/condition, treatment plan, diagnostic tests and medications  4.  Explain disease process/condition, treatment plan, diagnostic tests and medications  Outcome: Progressing     Problem: Chest Tube Management  Goal: Complications related to chest tube will be avoided or minimized  Description: Target End Date:  when tube discontinued    1.  Frequent respiratory assessments  2.  Encourage cough and deep breathing exercises  3.  Encourage mobility and meals out of bed  4.  Monitor chest tube output (drainage amount/color)  5.  Assess chest tube connection sites to ensure tube is patent with no air leaks  6.  Ensure water-seal chamber is at correct level  7.  Ensure appropriate level of function (water-seal, -20 cm H20, etc...)  8.  Monitor for sign/symptoms of crepitus  Outcome: Progressing     Problem: Pain - Standard  Goal: Alleviation of pain or a reduction in pain to the patient’s comfort goal  Description: Target End Date:  Prior to discharge or change in level of care    Document on Vitals flowsheet    1.  Document pain  using the appropriate pain scale per order or unit policy  2.  Educate and implement non-pharmacologic comfort measures (i.e. relaxation, distraction, massage, cold/heat therapy, etc.)  3.  Pain management medications as ordered  4.  Reassess pain after pain med administration per policy  5.  If opiods administered assess patient's response to pain medication is appropriate per POSS sedation scale  6.  Follow pain management plan developed in collaboration with patient and interdisciplinary team (including palliative care or pain specialists if applicable)  Outcome: Progressing       Patient is not progressing towards the following goals:

## 2024-01-24 NOTE — PROGRESS NOTES
Patient complained of feeling flushed, this RN took a set of vitals: Temp 99.0, , BP 76/63, SpO2 94% on RA. This RN retook her BP on the left arm reading 45/29 and . Patient denies feeling dizzy or lightheaded. Denies recent ambulation. Patient earlier ambulated several times to the bathroom and in the hallway without issue. Patient continues to have serosanguinous output from chest tube. Charge RN notified. Dr Bonds notified, stat H&H ordered.     Notified Dr Bonds of H&H results of 12.2 from 11.4. Received orders to take BP on leg if patient is asymptomatic. No new orders at this time.

## 2024-01-24 NOTE — CARE PLAN
The patient is Stable - Low risk of patient condition declining or worsening    Shift Goals  Clinical Goals: monitor chest tube, monitor vitals, pulm hygiene  Patient Goals: OOB acivity, rest    Progress made toward(s) clinical / shift goals:      Patient's chest tube will be maintained for duration of shift with frequent site assessment, pulmonary hygiene, and increased ambulation.    Problem: Chest Tube Management  Goal: Complications related to chest tube will be avoided or minimized  Outcome: Progressing

## 2024-01-24 NOTE — PROGRESS NOTES
Bedside report received.  Assessment complete.  A&O x 4. Patient calls appropriately.  Patient ambulates with standby assist. Bed alarm off.   Patient has 0/10 pain. Pain managed with prescribed medications.  Denies N&V. Tolerating regular diet.  Surgical R chest tube to -20 wall suction.  + void, + flatus, + BM.  Patient denies SOB.  SCD's refused.   Review plan with of care with patient. Call light and personal belongings within reach. Hourly rounding in place. All needs met at this time.

## 2024-01-25 ENCOUNTER — PATIENT OUTREACH (OUTPATIENT)
Dept: SCHEDULING | Facility: IMAGING CENTER | Age: 69
End: 2024-01-25
Payer: MEDICARE

## 2024-01-25 ENCOUNTER — APPOINTMENT (OUTPATIENT)
Dept: RADIOLOGY | Facility: MEDICAL CENTER | Age: 69
DRG: 163 | End: 2024-01-25
Attending: STUDENT IN AN ORGANIZED HEALTH CARE EDUCATION/TRAINING PROGRAM
Payer: MEDICARE

## 2024-01-25 VITALS
WEIGHT: 117.5 LBS | DIASTOLIC BLOOD PRESSURE: 92 MMHG | HEART RATE: 81 BPM | TEMPERATURE: 97.7 F | RESPIRATION RATE: 20 BRPM | SYSTOLIC BLOOD PRESSURE: 124 MMHG | BODY MASS INDEX: 22.19 KG/M2 | OXYGEN SATURATION: 91 % | HEIGHT: 61 IN

## 2024-01-25 LAB
ANION GAP SERPL CALC-SCNC: 14 MMOL/L (ref 7–16)
BACTERIA TISS AEROBE CULT: NORMAL
BASOPHILS # BLD AUTO: 0.2 % (ref 0–1.8)
BASOPHILS # BLD: 0.04 K/UL (ref 0–0.12)
BUN SERPL-MCNC: 6 MG/DL (ref 8–22)
CALCIUM SERPL-MCNC: 8.7 MG/DL (ref 8.5–10.5)
CHLORIDE SERPL-SCNC: 95 MMOL/L (ref 96–112)
CO2 SERPL-SCNC: 22 MMOL/L (ref 20–33)
CREAT SERPL-MCNC: 0.34 MG/DL (ref 0.5–1.4)
EOSINOPHIL # BLD AUTO: 0.2 K/UL (ref 0–0.51)
EOSINOPHIL NFR BLD: 1.2 % (ref 0–6.9)
ERYTHROCYTE [DISTWIDTH] IN BLOOD BY AUTOMATED COUNT: 44.1 FL (ref 35.9–50)
GFR SERPLBLD CREATININE-BSD FMLA CKD-EPI: 112 ML/MIN/1.73 M 2
GLUCOSE SERPL-MCNC: 106 MG/DL (ref 65–99)
GRAM STN SPEC: NORMAL
HCT VFR BLD AUTO: 33.4 % (ref 37–47)
HGB BLD-MCNC: 11.6 G/DL (ref 12–16)
IMM GRANULOCYTES # BLD AUTO: 0.07 K/UL (ref 0–0.11)
IMM GRANULOCYTES NFR BLD AUTO: 0.4 % (ref 0–0.9)
LYMPHOCYTES # BLD AUTO: 2.45 K/UL (ref 1–4.8)
LYMPHOCYTES NFR BLD: 14.8 % (ref 22–41)
MAGNESIUM SERPL-MCNC: 1.9 MG/DL (ref 1.5–2.5)
MCH RBC QN AUTO: 33 PG (ref 27–33)
MCHC RBC AUTO-ENTMCNC: 34.7 G/DL (ref 32.2–35.5)
MCV RBC AUTO: 94.9 FL (ref 81.4–97.8)
MONOCYTES # BLD AUTO: 1.8 K/UL (ref 0–0.85)
MONOCYTES NFR BLD AUTO: 10.9 % (ref 0–13.4)
NEUTROPHILS # BLD AUTO: 11.99 K/UL (ref 1.82–7.42)
NEUTROPHILS NFR BLD: 72.5 % (ref 44–72)
NRBC # BLD AUTO: 0 K/UL
NRBC BLD-RTO: 0 /100 WBC (ref 0–0.2)
PHOSPHATE SERPL-MCNC: 3.1 MG/DL (ref 2.5–4.5)
PLATELET # BLD AUTO: 335 K/UL (ref 164–446)
PMV BLD AUTO: 9.4 FL (ref 9–12.9)
POTASSIUM SERPL-SCNC: 3.1 MMOL/L (ref 3.6–5.5)
RBC # BLD AUTO: 3.52 M/UL (ref 4.2–5.4)
SIGNIFICANT IND 70042: NORMAL
SITE SITE: NORMAL
SODIUM SERPL-SCNC: 131 MMOL/L (ref 135–145)
SOURCE SOURCE: NORMAL
WBC # BLD AUTO: 16.6 K/UL (ref 4.8–10.8)

## 2024-01-25 PROCEDURE — 85025 COMPLETE CBC W/AUTO DIFF WBC: CPT

## 2024-01-25 PROCEDURE — 83735 ASSAY OF MAGNESIUM: CPT

## 2024-01-25 PROCEDURE — 700102 HCHG RX REV CODE 250 W/ 637 OVERRIDE(OP): Performed by: STUDENT IN AN ORGANIZED HEALTH CARE EDUCATION/TRAINING PROGRAM

## 2024-01-25 PROCEDURE — 71045 X-RAY EXAM CHEST 1 VIEW: CPT

## 2024-01-25 PROCEDURE — A9270 NON-COVERED ITEM OR SERVICE: HCPCS | Performed by: STUDENT IN AN ORGANIZED HEALTH CARE EDUCATION/TRAINING PROGRAM

## 2024-01-25 PROCEDURE — 700111 HCHG RX REV CODE 636 W/ 250 OVERRIDE (IP): Mod: JZ | Performed by: STUDENT IN AN ORGANIZED HEALTH CARE EDUCATION/TRAINING PROGRAM

## 2024-01-25 PROCEDURE — 84100 ASSAY OF PHOSPHORUS: CPT

## 2024-01-25 PROCEDURE — 80048 BASIC METABOLIC PNL TOTAL CA: CPT

## 2024-01-25 RX ORDER — FUROSEMIDE 20 MG/1
40 TABLET ORAL DAILY
Qty: 5 TABLET | Refills: 0 | Status: SHIPPED | OUTPATIENT
Start: 2024-01-25 | End: 2024-01-30

## 2024-01-25 RX ORDER — FUROSEMIDE 10 MG/ML
20 INJECTION INTRAMUSCULAR; INTRAVENOUS ONCE
Status: COMPLETED | OUTPATIENT
Start: 2024-01-25 | End: 2024-01-25

## 2024-01-25 RX ORDER — POTASSIUM CHLORIDE 20 MEQ/1
40 TABLET, EXTENDED RELEASE ORAL DAILY
Status: DISCONTINUED | OUTPATIENT
Start: 2024-01-25 | End: 2024-01-25 | Stop reason: HOSPADM

## 2024-01-25 RX ORDER — TRAMADOL HYDROCHLORIDE 50 MG/1
50 TABLET ORAL EVERY 6 HOURS PRN
Qty: 15 TABLET | Refills: 0 | Status: SHIPPED | OUTPATIENT
Start: 2024-01-25 | End: 2024-01-30

## 2024-01-25 RX ORDER — POTASSIUM CHLORIDE 20 MEQ/1
40 TABLET, EXTENDED RELEASE ORAL DAILY
Qty: 10 TABLET | Refills: 0 | Status: SHIPPED | OUTPATIENT
Start: 2024-01-26 | End: 2024-01-31

## 2024-01-25 RX ORDER — POTASSIUM CHLORIDE 7.45 MG/ML
10 INJECTION INTRAVENOUS
Status: DISPENSED | OUTPATIENT
Start: 2024-01-25 | End: 2024-01-25

## 2024-01-25 RX ADMIN — POTASSIUM CHLORIDE 10 MEQ: 7.46 INJECTION, SOLUTION INTRAVENOUS at 08:50

## 2024-01-25 RX ADMIN — FUROSEMIDE 20 MG: 10 INJECTION, SOLUTION INTRAVENOUS at 08:41

## 2024-01-25 RX ADMIN — POTASSIUM CHLORIDE 40 MEQ: 20 TABLET, EXTENDED RELEASE ORAL at 08:41

## 2024-01-25 ASSESSMENT — PAIN DESCRIPTION - PAIN TYPE
TYPE: ACUTE PAIN
TYPE: ACUTE PAIN

## 2024-01-25 NOTE — PROGRESS NOTES
Bedside report received.  Assessment complete.  A&O x 4. Patient calls appropriately.  Patient ambulates with standby assist. Bed alarm off.   Patient has 0/10 pain. Pain managed with prescribed medications.  Denies N&V. Tolerating regular diet.  Surgical old R chest tube site DIP; serous drainage noted.  + void, + flatus, + BM.  Patient denies SOB.  SCD's refused.   Review plan with of care with patient. Call light and personal belongings within reach. Hourly rounding in place. All needs met at this time.

## 2024-01-25 NOTE — PROGRESS NOTES
"Ms. Cyndee Mosley is a 68 y.o. female who presented to the hospital with A right recurrent pleural effusion and fibrothorax now status post:  Right thoracoscopic decortication on 1/22/2024    S: No pain, ambulating well. Urinating without issue    O:  /85   Pulse 100   Temp 36 °C (96.8 °F) (Temporal)   Resp 17   Ht 1.549 m (5' 1\")   Wt 53.3 kg (117 lb 8.1 oz)   SpO2 93%   BMI 22.20 kg/m²     GEN: awake, alert oriented  CV: RRR, brisk cap refill on hands  RESP: no labored breathing, chest tube without airleak and minimal serous drainage  ABD: soft, nontender, nondistended    Recent Labs     01/23/24  0555 01/23/24  1729 01/24/24  0412   WBC 15.5*  --  14.1*   RBC 3.39*  --  3.44*   HEMOGLOBIN 11.4* 12.2 11.3*   HEMATOCRIT 32.6* 35.1* 33.3*   MCV 96.2  --  96.8   MCH 33.6*  --  32.8   RDW 44.7  --  46.3   PLATELETCT 342  --  328   MPV 9.2  --  9.2   NEUTSPOLYS  --   --  66.00   LYMPHOCYTES  --   --  21.40*   MONOCYTES  --   --  10.30   EOSINOPHILS  --   --  1.80   BASOPHILS  --   --  0.20     Recent Labs     01/23/24  0555 01/24/24  0412   SODIUM 132* 132*   POTASSIUM 4.2 3.7   CHLORIDE 99 99   CO2 24 24   GLUCOSE 134* 97   BUN 9 9     INR   Date Value Ref Range Status   10/04/2023 1.07 0.87 - 1.13 Final     Comment:     INR - Non-therapeutic Reference Range: 0.87-1.13  INR - Therapeutic Reference Range: 2.0-4.0         Imaging: Chest x-ray with some increased pulmonary edema and mild right effusion    A/P:   Ms. Cyndee Mosley is a 68 y.o. female who presented to the hospital with A right recurrent pleural effusion and fibrothorax now status post:  Right thoracoscopic decortication on 1/22/2024    Given Lasix this morning to help with the pulmonary edema and pulmonary effusion.  Chest tube was removed this evening at around 6 PM we will repeat a chest x-ray in the morning and potentially home after the chest x-ray.    Rolan Bonds MD  Thoracic & General Surgeon  [facility]  [phone]

## 2024-01-25 NOTE — DISCHARGE SUMMARY
Discharge Summary    CHIEF COMPLAINT ON ADMISSION  No chief complaint on file.      Reason for Admission  Pleural effusion     Admission Date  1/22/2024    CODE STATUS  Full Code    HPI & HOSPITAL COURSE  This is a 68 y.o. female here with a chronic right pleural effusion with fibrothorax.  She is having shortness of breath and some pain in her chest with this.  She underwent a thoracoscopic decortication on 1/22/2024.  She tolerated the procedure well without complication.  Postoperatively her chest tube remained in place for 2 days.  She required some diuresis to help with a pleural effusion.  She had minimal pain throughout her stay.  She was able to ambulate easily and brief oxygen.  She was discharged home in good condition with oral Lasix and supplemental potassium to help with the pleural effusion.   No notes on file    Therefore, she is discharged in good and stable condition to home with close outpatient follow-up.    The patient met 2-midnight criteria for an inpatient stay at the time of discharge.    Discharge Date  1/25/2024    FOLLOW UP ITEMS POST DISCHARGE  We'll follow with me in 2 weeks with a chest x-ray    DISCHARGE DIAGNOSES  Active Problems:    * No active hospital problems. *  Resolved Problems:    * No resolved hospital problems. *      FOLLOW UP  Future Appointments   Date Time Provider Department Center   3/6/2024 12:00 PM [facility]     No follow-up provider specified.    MEDICATIONS ON DISCHARGE     Medication List        START taking these medications        Instructions   furosemide 20 MG Tabs  Commonly known as: Lasix   Take 2 Tablets by mouth every day for 5 days.  Dose: 40 mg     potassium chloride SA 20 MEQ Tbcr  Start taking on: January 26, 2024  Commonly known as: Kdur   Take 2 Tablets by mouth every day for 5 days.  Dose: 40 mEq     traMADol 50 MG Tabs  Commonly known as: Ultram   Take 1 Tablet by mouth every 6 hours as needed for Moderate Pain for up to 5 days.  Dose:  50 mg            CONTINUE taking these medications        Instructions   * AIRBORNE GUMMIES PO   Take 1 Tablet by mouth every morning.  Dose: 1 Tablet     * Hair Skin and Nails Formula Tabs   Take 2 Tablets by mouth every morning.  Dose: 2 Tablet     albuterol 108 (90 Base) MCG/ACT Aers inhalation aerosol   Inhale 1-2 Puffs every 6 hours as needed for Shortness of Breath.  Dose: 1-2 Puff     FISH OIL PO   Take 1 Capsule by mouth every morning.  Dose: 1 Capsule     PRENATAL PO   Take 1 Tablet by mouth every morning.  Dose: 1 Tablet     PUMPKIN SEED OIL PO   Take 1 Tablet by mouth every morning.  Dose: 1 Tablet     Vitamin B Complex Tabs   Take 1 Tablet by mouth every morning.  Dose: 1 Tablet     VITAMIN C PO   Take 2 Tablets by mouth every morning.  Dose: 2 Tablet     VITAMIN D3 PO   Take 1 Tablet by mouth every morning.  Dose: 1 Tablet           * This list has 2 medication(s) that are the same as other medications prescribed for you. Read the directions carefully, and ask your doctor or other care provider to review them with you.                  Allergies  Allergies   Allergen Reactions    Sulfa Drugs Rash     Rash         DIET  Orders Placed This Encounter   Procedures    Diet Order Diet: Regular     Standing Status:   Standing     Number of Occurrences:   1     Order Specific Question:   ERAS     Answer:   Yes     Order Specific Question:   Diet:     Answer:   Regular [1]       ACTIVITY  As tolerated.  Weight bearing as tolerated    CONSULTATIONS  none    PROCEDURES  Thorascopic decortication right side    LABORATORY  Lab Results   Component Value Date    SODIUM 131 (L) 01/25/2024    POTASSIUM 3.1 (L) 01/25/2024    CHLORIDE 95 (L) 01/25/2024    CO2 22 01/25/2024    GLUCOSE 106 (H) 01/25/2024    BUN 6 (L) 01/25/2024    CREATININE 0.34 (L) 01/25/2024        Lab Results   Component Value Date    WBC 16.6 (H) 01/25/2024    HEMOGLOBIN 11.6 (L) 01/25/2024    HEMATOCRIT 33.4 (L) 01/25/2024    PLATELETCT 335 01/25/2024         Total time of the discharge process exceeds 30 minutes.

## 2024-01-25 NOTE — PROGRESS NOTES
IV removed. Discharge paperwork discussed. All questions answered. Follow up appointment discussed. Patient discharged home via car with . Patient has all personal belongings.

## 2024-01-25 NOTE — DISCHARGE INSTRUCTIONS
Discharge instructions:    DIET: Upon discharge from the hospital you may resume your normal diet. Depending on how you are feeling and whether you have nausea or not, you may wish to stay with a bland diet for the first few days. However, you can advance this as quickly as you feel ready.    ACTIVITIES: After discharge from the hospital, you may resume full routine activities. However, there should be no heavy lifting (greater than 15 pounds) and no strenuous activities for two weeks. Otherwise, routine activities of daily living are acceptable.    DRIVING: You may drive whenever you are off pain medications and are able to perform the activities needed to drive, i.e. turning, bending, twisting, etc.    BATHING: You may shower the day after surgery, but do not let the jets hit directly on the incisions. Let the soapy water drip down over the incisions. Do not submerge in a bath for at least a week. Your skin has glue on the incisions that will act as a scab and slowly come off after a week or two.    BOWEL FUNCTION: A few patients, after this operation, will develop either frequent or loose stools after meals. This usually corrects itself after a few days, to a few weeks. If this occurs, do not worry; it is not unusual and will resolve. Much more common than loose stools, is constipation. The combination of pain medication and decreased activity level can cause constipation in otherwise normal patients. If you feel this is occurring, take a laxative (Milk of Magnesia, Ex-Lax, Senokot, etc.) until the problem has resolved.    PAIN MEDICATION: You will be given a prescription for pain medication at discharge. Please take these as directed. It is important to remember not to take medications on an empty stomach as this may cause nausea. Also, be aware narcotic pain medications cause constipation. Please take over the counter stool softeners while taking narcotic pain medications.    CALL IF YOU HAVE: (1) Fevers to  more than 1010 F, (2) Unusual chest or leg pain, (3) Drainage or fluid from incision that may be foul smelling, increased tenderness or soreness at the wound or the wound edges are no longer together, redness or swelling at the incision site. Please do not hesitate to call with any other questions.     APPOINTMENT: Contact our office at [phone] for a follow-up appointment in 1 to 2 weeks following your procedure.    If you have any additional questions, please do not hesitate to call the office.    Office address:  [address] [place] [address], [address] [place] [address]    Rolan Bonds MD  Thoracic & General Surgeon  [facility]  [phone]

## 2024-01-25 NOTE — PROGRESS NOTES
Patient discharged to [facility] with  via wheelchair with CNA. Old chest tube site dressing changed with verbal order from Dr Bonds; changed gauze and tegaderm only, occlusive dressing still intact. Patient reports having all belongings. All needs met at this time.

## 2024-01-25 NOTE — RESPIRATORY CARE
"COPD EDUCATION by COPD CLINICAL EDUCATOR  1/24/2024 at 5:14 PM by Kendra Bowles, RRT     Patient interviewed by COPD education team. Patient refused COPD program at this time. An Action Plan was completed in the EMR to reflect current Respiratory Medication use.                 COPD Assessment  COPD Clinical Specialists ONLY  COPD Education Initiated: Yes--Short Intervention (met with pt and family congrats on cessation! review rescue inhaler she has Ehysema and reoccurent pl effusing Pulmonary says COPD -pt denies- encouraged follow up with her Pulmonary team declined appt Empysema changes on CT)  Is this a COPD exacerbation patient?: No  DME Company: none currently (returned)  Physician Name: Khang Marie D.N.P.  Pulmonologist Name: [facility]  Smoking Cessation: No (QUIT 10/3/2023)  Home Health Care:  (no 6 clicks)  [facility] Group: N/A  $ Demo/Eval of SVN's, MDI's and Aerosols:  (refused)    PFT Results  No results found for: \"PFT\"  (OP) Pulmonary Function Testing:  (pending with Pulmonary office)  Interdisciplinary Rounds: Attendance at Rounds (30 Min)  Meds to Beds  [facility] for all eligible patients at discharge.  Would you like to opt out of this program for any reason?: No - Stay Opted In     MY COPD ACTION PLAN     It is recommended that patients and physicians /healthcare providers complete this action plan together. This plan should be discussed at each physician visit and updated as needed.    The green, yellow and red zones show groups of symptoms of COPD. This list of symptoms is not comprehensive, and you may experience other symptoms. In the \"Actions\" column, your healthcare provider has recommended actions for you to take based on your symptoms.    Patient Name: Cyndee Mosley   YOB: 1955   Last Updated on:     Green Zone:  I am doing well today Actions     Usual activitiy and exercise level   Take daily medications     Usual " "amounts of cough and phlegm/mucus   Use oxygen as prescribed     Sleep well at night   Continue regular exercise/diet plan     Appetite is good   At all times avoid cigarette smoke, inhaled irritants     Daily Medications (these medications are taken every day):                Yellow Zone:  I am having a bad day or a COPD flare Actions     More breathless than usual   Continue daily medications     I have less energy for my daily activities   Use quick relief inhaler as ordered     Increased or thicker phlegm/mucus   Use oxygen as prescribed     Using quick relief inhaler/nebulizer more often   Get plenty of rest     Swelling of ankles more than usual   Use pursed lip breathing     More coughing than usual   At all times avoid cigarette smoke, inhaled irritants     I feel like I have a \"chest cold\"     Poor sleep and my symptoms woke me up     My appetite is not good     My medicine is not helping      Call provider immediately if symptoms don’t improve     Continue daily medications, add rescue medications:   Albuterol 2 Puffs Every 4 hours PRN       Medications to be used during a flare up, (as Discussed with Provider):           Additional Information:  Use the spacer with your rescue inhaler    Red Zone:  I need urgent medical care Actions     Severe shortness of breath even at rest   Call 911 or seek medical care immediately     Not able to do any activity because of breathing      Fever or shaking chills      Feeling confused or very drowsy       Chest pains      Coughing up blood                  "

## 2024-01-25 NOTE — CARE PLAN
The patient is Stable - Low risk of patient condition declining or worsening    Shift Goals  Clinical Goals: monitor respiratory effort  Patient Goals: rest    Progress made toward(s) clinical / shift goals:    Problem: Knowledge Deficit - Standard  Goal: Patient and family/care givers will demonstrate understanding of plan of care, disease process/condition, diagnostic tests and medications  Outcome: Progressing   Educate patient on plan of care and encourage pt to ask questions.

## 2024-01-27 LAB
BACTERIA SPEC ANAEROBE CULT: NORMAL
SIGNIFICANT IND 70042: NORMAL
SITE SITE: NORMAL
SOURCE SOURCE: NORMAL

## 2024-01-27 NOTE — DOCUMENTATION QUERY
[facility]                                                                       Query Response Note      PATIENT:               DARLING VERONICA  ACCT #:                  [id number]  MRN:                     [id number]  :                      1955  ADMIT DATE:       2024 12:29 PM  DISCH DATE:        2024 1:11 PM  RESPONDING  PROVIDER #:        [id number]           QUERY TEXT:    The medical record includes the diagnosis of pulmonary edema.      Can the type and acuity of the condition be further specified?    The patient's Clinical Indicators include:   CXR:   - Hazy right pulmonary infiltrates, increased on the right compared to prior study.  - Trace bilateral pleural effusions, increased on the right since prior study      TS: Given Lasix this morning to help with the pulmonary edema and pulmonary effusion.    Risk Factors: s/p Thoracoscopic decortication  for recurrent pleural effusion and fibrothorax   Treatment: Furosemide 20mg IV, repeat CXR      Thank you for your time and attention,  MERT Gudino RN  Available via Voalte  Options provided:   -- Acute noncardiogenic pulmonary edema   -- Acute cardiogenic pulmonary edema, (please specify type and acuity of CHF, if applicable, or other cardiac condition)   -- Chronic noncardiogenic pulmonary edema   -- Chronic cardiogenic pulmonary edema, (please specify type and acuity of CHF, if applicable, or other cardiac condition)   -- Other explanation, (please specify other explanation)      Query created by: Janki Weinstein on 2024 8:15 AM    RESPONSE TEXT:    Acute noncardiogenic pulmonary edema          Electronically signed by:  MASON STEWART 2024 4:35 PM

## 2024-02-15 ENCOUNTER — HOSPITAL ENCOUNTER (OUTPATIENT)
Dept: RADIOLOGY | Facility: MEDICAL CENTER | Age: 69
End: 2024-02-15
Attending: STUDENT IN AN ORGANIZED HEALTH CARE EDUCATION/TRAINING PROGRAM
Payer: MEDICARE

## 2024-02-15 DIAGNOSIS — J90 PLEURAL EFFUSION, NOT ELSEWHERE CLASSIFIED: ICD-10-CM

## 2024-02-15 PROCEDURE — 71046 X-RAY EXAM CHEST 2 VIEWS: CPT

## 2024-03-06 ENCOUNTER — APPOINTMENT (OUTPATIENT)
Dept: RADIOLOGY | Facility: MEDICAL CENTER | Age: 69
End: 2024-03-06
Payer: MEDICARE

## 2024-03-21 ENCOUNTER — HOSPITAL ENCOUNTER (OUTPATIENT)
Dept: LAB | Facility: MEDICAL CENTER | Age: 69
End: 2024-03-21
Attending: FAMILY MEDICINE
Payer: MEDICARE

## 2024-03-21 LAB
25(OH)D3 SERPL-MCNC: 86 NG/ML (ref 30–100)
ALBUMIN SERPL BCP-MCNC: 4.4 G/DL (ref 3.2–4.9)
ALBUMIN/GLOB SERPL: 1.2 G/DL
ALP SERPL-CCNC: 97 U/L (ref 30–99)
ALT SERPL-CCNC: 18 U/L (ref 2–50)
ANION GAP SERPL CALC-SCNC: 14 MMOL/L (ref 7–16)
AST SERPL-CCNC: 22 U/L (ref 12–45)
BASOPHILS # BLD AUTO: 3.5 % (ref 0–1.8)
BASOPHILS # BLD: 0.51 K/UL (ref 0–0.12)
BILIRUB SERPL-MCNC: 0.2 MG/DL (ref 0.1–1.5)
BUN SERPL-MCNC: 10 MG/DL (ref 8–22)
CALCIUM ALBUM COR SERPL-MCNC: 9.9 MG/DL (ref 8.5–10.5)
CALCIUM SERPL-MCNC: 10.2 MG/DL (ref 8.5–10.5)
CHLORIDE SERPL-SCNC: 96 MMOL/L (ref 96–112)
CO2 SERPL-SCNC: 23 MMOL/L (ref 20–33)
CREAT SERPL-MCNC: 0.53 MG/DL (ref 0.5–1.4)
CRP SERPL HS-MCNC: 65.2 MG/L (ref 0–3)
DHEA-S SERPL-MCNC: 169 UG/DL (ref 9.4–246)
EOSINOPHIL # BLD AUTO: 1.28 K/UL (ref 0–0.51)
EOSINOPHIL NFR BLD: 8.8 % (ref 0–6.9)
ERYTHROCYTE [DISTWIDTH] IN BLOOD BY AUTOMATED COUNT: 44.8 FL (ref 35.9–50)
ERYTHROCYTE [SEDIMENTATION RATE] IN BLOOD BY WESTERGREN METHOD: 42 MM/HOUR (ref 0–25)
EST. AVERAGE GLUCOSE BLD GHB EST-MCNC: 114 MG/DL
ESTRADIOL SERPL-MCNC: <5 PG/ML
FASTING STATUS PATIENT QL REPORTED: NORMAL
FOLATE SERPL-MCNC: 34.4 NG/ML
FSH SERPL-ACNC: 51.4 MIU/ML
GFR SERPLBLD CREATININE-BSD FMLA CKD-EPI: 100 ML/MIN/1.73 M 2
GLOBULIN SER CALC-MCNC: 3.7 G/DL (ref 1.9–3.5)
GLUCOSE SERPL-MCNC: 84 MG/DL (ref 65–99)
HBA1C MFR BLD: 5.6 % (ref 4–5.6)
HCT VFR BLD AUTO: 37.7 % (ref 37–47)
HCYS SERPL-SCNC: 9.88 UMOL/L
HGB BLD-MCNC: 12.9 G/DL (ref 12–16)
LH SERPL-ACNC: 37.9 IU/L
LYMPHOCYTES # BLD AUTO: 4.22 K/UL (ref 1–4.8)
LYMPHOCYTES NFR BLD: 28.9 % (ref 22–41)
MAGNESIUM SERPL-MCNC: 2.2 MG/DL (ref 1.5–2.5)
MANUAL DIFF BLD: NORMAL
MCH RBC QN AUTO: 32.5 PG (ref 27–33)
MCHC RBC AUTO-ENTMCNC: 34.2 G/DL (ref 32.2–35.5)
MCV RBC AUTO: 95 FL (ref 81.4–97.8)
MONOCYTES # BLD AUTO: 0.51 K/UL (ref 0–0.85)
MONOCYTES NFR BLD AUTO: 3.5 % (ref 0–13.4)
MORPHOLOGY BLD-IMP: NORMAL
NEUTROPHILS # BLD AUTO: 8.07 K/UL (ref 1.82–7.42)
NEUTROPHILS NFR BLD: 55.3 % (ref 44–72)
NRBC # BLD AUTO: 0 K/UL
NRBC BLD-RTO: 0 /100 WBC (ref 0–0.2)
PLATELET # BLD AUTO: 608 K/UL (ref 164–446)
PLATELET BLD QL SMEAR: NORMAL
PMV BLD AUTO: 8.9 FL (ref 9–12.9)
POTASSIUM SERPL-SCNC: 4.2 MMOL/L (ref 3.6–5.5)
PROGEST SERPL-MCNC: 0.31 NG/ML
PROT SERPL-MCNC: 8.1 G/DL (ref 6–8.2)
RBC # BLD AUTO: 3.97 M/UL (ref 4.2–5.4)
RBC BLD AUTO: NORMAL
SODIUM SERPL-SCNC: 133 MMOL/L (ref 135–145)
T3FREE SERPL-MCNC: 3.32 PG/ML (ref 2–4.4)
T4 FREE SERPL-MCNC: 1.53 NG/DL (ref 0.93–1.7)
T4 SERPL-MCNC: 9.9 UG/DL (ref 4–12)
THYROPEROXIDASE AB SERPL-ACNC: 11 IU/ML (ref 0–9)
TSH SERPL DL<=0.005 MIU/L-ACNC: 0.98 UIU/ML (ref 0.38–5.33)
URATE SERPL-MCNC: 4 MG/DL (ref 1.9–8.2)
VIT B12 SERPL-MCNC: 3643 PG/ML (ref 211–911)
WBC # BLD AUTO: 14.6 K/UL (ref 4.8–10.8)

## 2024-03-21 PROCEDURE — 82746 ASSAY OF FOLIC ACID SERUM: CPT

## 2024-03-21 PROCEDURE — 86141 C-REACTIVE PROTEIN HS: CPT | Mod: GA

## 2024-03-21 PROCEDURE — 86200 CCP ANTIBODY: CPT

## 2024-03-21 PROCEDURE — 82679 ASSAY OF ESTRONE: CPT

## 2024-03-21 PROCEDURE — 85027 COMPLETE CBC AUTOMATED: CPT

## 2024-03-21 PROCEDURE — 83002 ASSAY OF GONADOTROPIN (LH): CPT

## 2024-03-21 PROCEDURE — 86800 THYROGLOBULIN ANTIBODY: CPT

## 2024-03-21 PROCEDURE — 82642 DIHYDROTESTOSTERONE: CPT

## 2024-03-21 PROCEDURE — 86431 RHEUMATOID FACTOR QUANT: CPT

## 2024-03-21 PROCEDURE — 84482 T3 REVERSE: CPT

## 2024-03-21 PROCEDURE — 83036 HEMOGLOBIN GLYCOSYLATED A1C: CPT | Mod: GA

## 2024-03-21 PROCEDURE — 84550 ASSAY OF BLOOD/URIC ACID: CPT

## 2024-03-21 PROCEDURE — 83516 IMMUNOASSAY NONANTIBODY: CPT

## 2024-03-21 PROCEDURE — 85007 BL SMEAR W/DIFF WBC COUNT: CPT

## 2024-03-21 PROCEDURE — 84270 ASSAY OF SEX HORMONE GLOBUL: CPT

## 2024-03-21 PROCEDURE — 82627 DEHYDROEPIANDROSTERONE: CPT

## 2024-03-21 PROCEDURE — 84681 ASSAY OF C-PEPTIDE: CPT

## 2024-03-21 PROCEDURE — 83001 ASSAY OF GONADOTROPIN (FSH): CPT

## 2024-03-21 PROCEDURE — 84630 ASSAY OF ZINC: CPT

## 2024-03-21 PROCEDURE — 83090 ASSAY OF HOMOCYSTEINE: CPT | Mod: GA

## 2024-03-21 PROCEDURE — 86628 CANDIDA ANTIBODY: CPT

## 2024-03-21 PROCEDURE — 85652 RBC SED RATE AUTOMATED: CPT

## 2024-03-21 PROCEDURE — 86376 MICROSOMAL ANTIBODY EACH: CPT

## 2024-03-21 PROCEDURE — 84140 ASSAY OF PREGNENOLONE: CPT

## 2024-03-21 PROCEDURE — 84443 ASSAY THYROID STIM HORMONE: CPT

## 2024-03-21 PROCEDURE — 82306 VITAMIN D 25 HYDROXY: CPT | Mod: GA

## 2024-03-21 PROCEDURE — 84439 ASSAY OF FREE THYROXINE: CPT

## 2024-03-21 PROCEDURE — 84305 ASSAY OF SOMATOMEDIN: CPT

## 2024-03-21 PROCEDURE — 82670 ASSAY OF TOTAL ESTRADIOL: CPT

## 2024-03-21 PROCEDURE — 84144 ASSAY OF PROGESTERONE: CPT

## 2024-03-21 PROCEDURE — 84432 ASSAY OF THYROGLOBULIN: CPT

## 2024-03-21 PROCEDURE — 80053 COMPREHEN METABOLIC PANEL: CPT

## 2024-03-21 PROCEDURE — 84403 ASSAY OF TOTAL TESTOSTERONE: CPT

## 2024-03-21 PROCEDURE — 36415 COLL VENOUS BLD VENIPUNCTURE: CPT

## 2024-03-21 PROCEDURE — 83735 ASSAY OF MAGNESIUM: CPT

## 2024-03-21 PROCEDURE — 82607 VITAMIN B-12: CPT

## 2024-03-21 PROCEDURE — 84402 ASSAY OF FREE TESTOSTERONE: CPT

## 2024-03-21 PROCEDURE — 84481 FREE ASSAY (FT-3): CPT

## 2024-03-23 LAB
C PEPTIDE SERPL-MCNC: 1.4 NG/ML (ref 0.5–3.3)
IGF-I SERPL-MCNC: 97 NG/ML (ref 28–231)
IGF-I Z-SCORE SERPL: -0.1
THYROGLOB AB SERPL-ACNC: <0.9 IU/ML (ref 0–4)
THYROGLOB SERPL-MCNC: 8.7 NG/ML (ref 1.3–31.8)
THYROGLOB SERPL-MCNC: NORMAL NG/ML (ref 1.3–31.8)
ZINC SERPL-MCNC: 89.6 UG/DL (ref 60–120)

## 2024-03-24 LAB
PREG SERPL-MCNC: 65 NG/DL (ref 15–132)
TEST NAME 95000: NORMAL

## 2024-03-25 LAB
C ALBICANS IGA SER-ACNC: 0.13 EV
C ALBICANS IGG QN: 0.18 EV
C ALBICANS IGM SER-ACNC: 0.19 EV
ESTRONE SERPL-MCNC: 24.2 PG/ML

## 2024-03-26 ENCOUNTER — HOSPITAL ENCOUNTER (OUTPATIENT)
Dept: LAB | Facility: MEDICAL CENTER | Age: 69
End: 2024-03-26
Attending: PEDIATRICS
Payer: MEDICARE

## 2024-03-26 ENCOUNTER — HOSPITAL ENCOUNTER (OUTPATIENT)
Dept: LAB | Facility: MEDICAL CENTER | Age: 69
End: 2024-03-26
Attending: FAMILY MEDICINE
Payer: MEDICARE

## 2024-03-26 LAB
ANDROSTANOLONE SERPL-MCNC: 70 PG/ML (ref 24–208)
CHOLEST SERPL-MCNC: 234 MG/DL (ref 100–199)
FASTING STATUS PATIENT QL REPORTED: NORMAL
HDLC SERPL-MCNC: 77 MG/DL
LDLC SERPL CALC-MCNC: 138 MG/DL
MISCELLANEOUS LAB RESULT MISCLAB: ABNORMAL
TRIGL SERPL-MCNC: 97 MG/DL (ref 0–149)

## 2024-03-26 PROCEDURE — 36415 COLL VENOUS BLD VENIPUNCTURE: CPT | Mod: GA

## 2024-03-26 PROCEDURE — 80061 LIPID PANEL: CPT | Mod: GA

## 2024-03-26 PROCEDURE — 83525 ASSAY OF INSULIN: CPT

## 2024-03-27 LAB
CARBAMYLATED PROTEIN ANTIBODY, IGG Q6043: 14 UNITS (ref 0–19)
CCP IGA+IGG SERPL IA-ACNC: 188 UNITS (ref 0–19)
INSULIN P FAST SERPL-ACNC: 5 UIU/ML (ref 3–25)
RHEUMATOID FACT SER NEPH-ACNC: 150 IU/ML (ref 0–14)
SHBG SERPL-SCNC: 78 NMOL/L (ref 17–125)
TESTOST FREE SERPL-MCNC: 3.8 PG/ML (ref 0.6–3.8)
TESTOST SERPL-MCNC: 40 NG/DL (ref 5–32)

## 2024-03-28 LAB — T3REVERSE SERPL-MCNC: 33.8 NG/DL (ref 9–27)

## 2024-04-03 ENCOUNTER — HOSPITAL ENCOUNTER (OUTPATIENT)
Dept: LAB | Facility: MEDICAL CENTER | Age: 69
End: 2024-04-03
Attending: FAMILY MEDICINE
Payer: MEDICARE

## 2024-04-03 PROCEDURE — 36415 COLL VENOUS BLD VENIPUNCTURE: CPT

## 2024-04-03 PROCEDURE — 83655 ASSAY OF LEAD: CPT

## 2024-04-03 PROCEDURE — 86001 ALLERGEN SPECIFIC IGG: CPT

## 2024-04-03 PROCEDURE — 82175 ASSAY OF ARSENIC: CPT

## 2024-04-03 PROCEDURE — 82300 ASSAY OF CADMIUM: CPT

## 2024-04-03 PROCEDURE — 83825 ASSAY OF MERCURY: CPT

## 2024-04-03 PROCEDURE — 87103 BLOOD FUNGUS CULTURE: CPT

## 2024-04-05 LAB
ARSENIC BLD-MCNC: <10 UG/L
CADMIUM BLD-MCNC: <1 UG/L
LEAD BLDV-MCNC: <2 UG/DL
MERCURY BLD-MCNC: <2.5 UG/L

## 2024-04-10 LAB
FUNGUS BLD CULT: NORMAL
SIGNIFICANT IND 70042: NORMAL
SITE SITE: NORMAL
SOURCE SOURCE: NORMAL

## 2024-04-11 LAB — TEST NAME 95000: NORMAL

## 2024-05-08 ENCOUNTER — HOSPITAL ENCOUNTER (OUTPATIENT)
Dept: RADIOLOGY | Facility: MEDICAL CENTER | Age: 69
End: 2024-05-08
Payer: MEDICARE

## 2024-05-08 DIAGNOSIS — Z12.31 ENCOUNTER FOR SCREENING MAMMOGRAM FOR MALIGNANT NEOPLASM OF BREAST: ICD-10-CM

## 2024-05-17 ENCOUNTER — APPOINTMENT (OUTPATIENT)
Dept: RADIOLOGY | Facility: IMAGING CENTER | Age: 69
End: 2024-05-17
Attending: NURSE PRACTITIONER
Payer: MEDICARE

## 2024-05-17 ENCOUNTER — HOSPITAL ENCOUNTER (OUTPATIENT)
Dept: LAB | Facility: MEDICAL CENTER | Age: 69
End: 2024-05-17
Attending: NURSE PRACTITIONER
Payer: MEDICARE

## 2024-05-17 ENCOUNTER — OFFICE VISIT (OUTPATIENT)
Dept: URGENT CARE | Facility: PHYSICIAN GROUP | Age: 69
End: 2024-05-17
Payer: MEDICARE

## 2024-05-17 VITALS
TEMPERATURE: 97.8 F | SYSTOLIC BLOOD PRESSURE: 110 MMHG | HEART RATE: 81 BPM | RESPIRATION RATE: 14 BRPM | OXYGEN SATURATION: 95 % | WEIGHT: 117 LBS | DIASTOLIC BLOOD PRESSURE: 62 MMHG | HEIGHT: 61 IN | BODY MASS INDEX: 22.09 KG/M2

## 2024-05-17 DIAGNOSIS — R05.9 COUGH, UNSPECIFIED TYPE: ICD-10-CM

## 2024-05-17 DIAGNOSIS — M79.89 FOOT SWELLING: ICD-10-CM

## 2024-05-17 DIAGNOSIS — R06.02 SHORTNESS OF BREATH: ICD-10-CM

## 2024-05-17 DIAGNOSIS — J91.8 PLEURAL EFFUSION ASSOCIATED WITH PULMONARY INFECTION: ICD-10-CM

## 2024-05-17 DIAGNOSIS — J18.9 PLEURAL EFFUSION ASSOCIATED WITH PULMONARY INFECTION: ICD-10-CM

## 2024-05-17 LAB
ALBUMIN SERPL BCP-MCNC: 4.2 G/DL (ref 3.2–4.9)
ALBUMIN/GLOB SERPL: 1.4 G/DL
ALP SERPL-CCNC: 96 U/L (ref 30–99)
ALT SERPL-CCNC: 10 U/L (ref 2–50)
ANION GAP SERPL CALC-SCNC: 12 MMOL/L (ref 7–16)
AST SERPL-CCNC: 18 U/L (ref 12–45)
BASOPHILS # BLD AUTO: 0.7 % (ref 0–1.8)
BASOPHILS # BLD: 0.09 K/UL (ref 0–0.12)
BILIRUB SERPL-MCNC: 0.2 MG/DL (ref 0.1–1.5)
BUN SERPL-MCNC: 9 MG/DL (ref 8–22)
CALCIUM ALBUM COR SERPL-MCNC: 9.6 MG/DL (ref 8.5–10.5)
CALCIUM SERPL-MCNC: 9.8 MG/DL (ref 8.5–10.5)
CHLORIDE SERPL-SCNC: 97 MMOL/L (ref 96–112)
CO2 SERPL-SCNC: 24 MMOL/L (ref 20–33)
CREAT SERPL-MCNC: 0.55 MG/DL (ref 0.5–1.4)
EOSINOPHIL # BLD AUTO: 1.24 K/UL (ref 0–0.51)
EOSINOPHIL NFR BLD: 9.5 % (ref 0–6.9)
ERYTHROCYTE [DISTWIDTH] IN BLOOD BY AUTOMATED COUNT: 48.9 FL (ref 35.9–50)
GFR SERPLBLD CREATININE-BSD FMLA CKD-EPI: 99 ML/MIN/1.73 M 2
GLOBULIN SER CALC-MCNC: 3.1 G/DL (ref 1.9–3.5)
GLUCOSE SERPL-MCNC: 122 MG/DL (ref 65–99)
HCT VFR BLD AUTO: 41.6 % (ref 37–47)
HGB BLD-MCNC: 14.1 G/DL (ref 12–16)
IMM GRANULOCYTES # BLD AUTO: 0.03 K/UL (ref 0–0.11)
IMM GRANULOCYTES NFR BLD AUTO: 0.2 % (ref 0–0.9)
LYMPHOCYTES # BLD AUTO: 2.22 K/UL (ref 1–4.8)
LYMPHOCYTES NFR BLD: 17.1 % (ref 22–41)
MCH RBC QN AUTO: 32.6 PG (ref 27–33)
MCHC RBC AUTO-ENTMCNC: 33.9 G/DL (ref 32.2–35.5)
MCV RBC AUTO: 96.1 FL (ref 81.4–97.8)
MONOCYTES # BLD AUTO: 1.16 K/UL (ref 0–0.85)
MONOCYTES NFR BLD AUTO: 8.9 % (ref 0–13.4)
NEUTROPHILS # BLD AUTO: 8.27 K/UL (ref 1.82–7.42)
NEUTROPHILS NFR BLD: 63.6 % (ref 44–72)
NRBC # BLD AUTO: 0 K/UL
NRBC BLD-RTO: 0 /100 WBC (ref 0–0.2)
NT-PROBNP SERPL IA-MCNC: 106 PG/ML (ref 0–125)
PLATELET # BLD AUTO: 509 K/UL (ref 164–446)
PMV BLD AUTO: 9.3 FL (ref 9–12.9)
POTASSIUM SERPL-SCNC: 4.1 MMOL/L (ref 3.6–5.5)
PROT SERPL-MCNC: 7.3 G/DL (ref 6–8.2)
RBC # BLD AUTO: 4.33 M/UL (ref 4.2–5.4)
SODIUM SERPL-SCNC: 133 MMOL/L (ref 135–145)
WBC # BLD AUTO: 13 K/UL (ref 4.8–10.8)

## 2024-05-17 PROCEDURE — 3078F DIAST BP <80 MM HG: CPT | Performed by: NURSE PRACTITIONER

## 2024-05-17 PROCEDURE — 3074F SYST BP LT 130 MM HG: CPT | Performed by: NURSE PRACTITIONER

## 2024-05-17 PROCEDURE — 99213 OFFICE O/P EST LOW 20 MIN: CPT | Performed by: NURSE PRACTITIONER

## 2024-05-17 PROCEDURE — 71046 X-RAY EXAM CHEST 2 VIEWS: CPT | Mod: TC | Performed by: NURSE PRACTITIONER

## 2024-05-17 PROCEDURE — 1125F AMNT PAIN NOTED PAIN PRSNT: CPT | Performed by: NURSE PRACTITIONER

## 2024-05-17 RX ORDER — DOXYCYCLINE HYCLATE 100 MG/1
100 CAPSULE ORAL 2 TIMES DAILY
Qty: 14 CAPSULE | Refills: 0 | Status: SHIPPED | OUTPATIENT
Start: 2024-05-17 | End: 2024-05-24

## 2024-05-17 ASSESSMENT — ENCOUNTER SYMPTOMS
COUGH: 1
NAUSEA: 0
DIZZINESS: 0
SPUTUM PRODUCTION: 1
FEVER: 0
DIARRHEA: 0
HEADACHES: 0
SHORTNESS OF BREATH: 1
CHILLS: 0
MYALGIAS: 0

## 2024-05-17 ASSESSMENT — PAIN SCALES - GENERAL: PAINLEVEL: 8=MODERATE-SEVERE PAIN

## 2024-05-17 ASSESSMENT — FIBROSIS 4 INDEX: FIB4 SCORE: 0.58

## 2024-05-17 NOTE — PROGRESS NOTES
Subjective     Cyndee Mosley is a 68 y.o. female who presents with Foot Swelling (Bilateral x 2-3 week )            HPI  New problem.  Patient is a 60-year-old female presents with bilateral foot swelling on the dorsum x 2 to 3 weeks.  She also reports having a 2 to 3-week history of new onset of cough, and shortness of breath.  She does have a history of COPD and history of recurrent right pleural effusion.  She denies any fever, chills, nasal congestion, nausea, or diarrhea.    Sulfa drugs  Current Outpatient Medications on File Prior to Visit   Medication Sig Dispense Refill    albuterol 108 (90 Base) MCG/ACT Aero Soln inhalation aerosol Inhale 1-2 Puffs every 6 hours as needed for Shortness of Breath.      Prenatal Vit-Fe Fumarate-FA (PRENATAL PO) Take 1 Tablet by mouth every morning.      Ascorbic Acid (VITAMIN C PO) Take 2 Tablets by mouth every morning.      Multiple Vitamins-Minerals (AIRBORNE GUMMIES PO) Take 1 Tablet by mouth every morning.      B Complex Vitamins (VITAMIN B COMPLEX) Tab Take 1 Tablet by mouth every morning.      Cholecalciferol (VITAMIN D3 PO) Take 1 Tablet by mouth every morning.      Misc Natural Products (PUMPKIN SEED OIL PO) Take 1 Tablet by mouth every morning.      Omega-3 Fatty Acids (FISH OIL PO) Take 1 Capsule by mouth every morning.      Multiple Vitamins-Minerals (HAIR SKIN AND NAILS FORMULA) Tab Take 2 Tablets by mouth every morning.       No current facility-administered medications on file prior to visit.     Social History     Socioeconomic History    Marital status:      Spouse name: Not on file    Number of children: Not on file    Years of education: Not on file    Highest education level: 12th grade   Occupational History    Not on file   Tobacco Use    Smoking status: Former     Current packs/day: 0.00     Average packs/day: 1 pack/day for 30.3 years (30.3 ttl pk-yrs)     Types: Cigarettes     Start date: 6/1/1993     Quit date: 10/4/2023     Years since  quittin.6    Smokeless tobacco: Never   Vaping Use    Vaping status: Never Used   Substance and Sexual Activity    Alcohol use: Yes     Alcohol/week: 8.4 oz     Types: 14 Cans of beer per week    Drug use: Yes     Types: Marijuana, Oral     Comment: THC/CBD gummies every other day    Sexual activity: Not on file   Other Topics Concern    Not on file   Social History Narrative    Not on file     Social Determinants of Health     Financial Resource Strain: Low Risk  (10/6/2023)    Overall Financial Resource Strain (CARDIA)     Difficulty of Paying Living Expenses: Not hard at all   Food Insecurity: No Food Insecurity (10/6/2023)    Hunger Vital Sign     Worried About Running Out of Food in the Last Year: Never true     Ran Out of Food in the Last Year: Never true   Transportation Needs: No Transportation Needs (10/6/2023)    PRAPARE - Transportation     Lack of Transportation (Medical): No     Lack of Transportation (Non-Medical): No   Physical Activity: Insufficiently Active (10/6/2023)    Exercise Vital Sign     Days of Exercise per Week: 2 days     Minutes of Exercise per Session: 20 min   Stress: No Stress Concern Present (10/6/2023)    [employer] - Occupational Stress Questionnaire     Feeling of Stress : Not at all   Social Connections: Moderately Isolated (10/6/2023)    Social Connection and Isolation Panel [NHANES]     Frequency of Communication with Friends and Family: More than three times a week     Frequency of Social Gatherings with Friends and Family: Three times a week     Attends Gnosticist Services: Never     Active Member of Clubs or Organizations: No     Attends Club or Organization Meetings: Never     Marital Status:    Intimate Partner Violence: Not on file   Housing Stability: Low Risk  (10/6/2023)    Housing Stability Vital Sign     Unable to Pay for Housing in the Last Year: No     Number of Places Lived in the Last Year: 1     Unstable Housing in the  "Last Year: No     Breast Cancer-related family history is not on file.      Review of Systems   Constitutional:  Negative for chills, fever and malaise/fatigue.   Respiratory:  Positive for cough, sputum production and shortness of breath.    Cardiovascular:  Negative for chest pain.        Bilateral foot swelling   Gastrointestinal:  Negative for diarrhea and nausea.   Musculoskeletal:  Negative for myalgias.   Neurological:  Negative for dizziness and headaches.              Objective     /62 (BP Location: Right arm, Patient Position: Sitting, BP Cuff Size: Adult)   Pulse 81   Temp 36.6 °C (97.8 °F) (Temporal)   Resp 14   Ht 1.549 m (5' 1\")   Wt 53.1 kg (117 lb)   SpO2 95%   BMI 22.11 kg/m²      Physical Exam  Constitutional:       General: She is not in acute distress.     Appearance: Normal appearance. She is well-developed.   HENT:      Head: Normocephalic.      Right Ear: Tympanic membrane and external ear normal.      Left Ear: External ear normal.      Nose: Mucosal edema and rhinorrhea present.      Mouth/Throat:      Pharynx: No posterior oropharyngeal erythema.   Eyes:      General:         Right eye: No discharge.         Left eye: No discharge.      Conjunctiva/sclera: Conjunctivae normal.   Cardiovascular:      Rate and Rhythm: Normal rate and regular rhythm.      Heart sounds: Normal heart sounds.   Pulmonary:      Breath sounds: Rales present.      Comments: Intermittent rales in bases.  Musculoskeletal:         General: Normal range of motion.      Cervical back: Normal range of motion and neck supple.   Lymphadenopathy:      Cervical: No cervical adenopathy.   Skin:     General: Skin is warm and dry.   Neurological:      Mental Status: She is alert and oriented to person, place, and time.   Psychiatric:         Behavior: Behavior normal.         Thought Content: Thought content normal.                             Assessment & Plan        1. Pleural effusion associated with pulmonary " infection        2. Foot swelling  DX-CHEST-2 VIEWS    proBrain Natriuretic Peptide, NT    Comp Metabolic Panel    CBC WITH DIFFERENTIAL      3. Cough, unspecified type  DX-CHEST-2 VIEWS    doxycycline (VIBRAMYCIN) 100 MG Cap      4. Shortness of breath  proBrain Natriuretic Peptide, NT            Imaging with pleural effusion right and minimal on left.   Labs with mild elevation in WBC to 13.  Reviewed results with patient via phone call. VSS and no distress. Advised doxy and follow up with pulmonology on Monday.  ED precautions given.

## 2024-05-20 LAB
FUNGUS BLD CULT: NORMAL
SIGNIFICANT IND 70042: NORMAL
SITE SITE: NORMAL
SOURCE SOURCE: NORMAL

## 2024-07-01 ENCOUNTER — HOSPITAL ENCOUNTER (OUTPATIENT)
Dept: RADIOLOGY | Facility: MEDICAL CENTER | Age: 69
End: 2024-07-01
Attending: STUDENT IN AN ORGANIZED HEALTH CARE EDUCATION/TRAINING PROGRAM
Payer: MEDICARE

## 2024-07-01 DIAGNOSIS — J90 PLEURAL EFFUSION: ICD-10-CM

## 2024-07-01 PROCEDURE — 71046 X-RAY EXAM CHEST 2 VIEWS: CPT

## 2024-07-08 ENCOUNTER — OFFICE VISIT (OUTPATIENT)
Dept: SLEEP MEDICINE | Facility: MEDICAL CENTER | Age: 69
End: 2024-07-08
Attending: INTERNAL MEDICINE
Payer: MEDICARE

## 2024-07-08 VITALS
BODY MASS INDEX: 21.71 KG/M2 | OXYGEN SATURATION: 95 % | HEIGHT: 61 IN | SYSTOLIC BLOOD PRESSURE: 130 MMHG | HEART RATE: 77 BPM | DIASTOLIC BLOOD PRESSURE: 66 MMHG | WEIGHT: 115 LBS

## 2024-07-08 DIAGNOSIS — Z72.0 TOBACCO ABUSE: ICD-10-CM

## 2024-07-08 DIAGNOSIS — M05.9 RHEUMATOID ARTHRITIS WITH POSITIVE RHEUMATOID FACTOR, INVOLVING UNSPECIFIED SITE (HCC): ICD-10-CM

## 2024-07-08 DIAGNOSIS — R06.02 SHORTNESS OF BREATH: ICD-10-CM

## 2024-07-08 DIAGNOSIS — J30.2 SEASONAL ALLERGIES: ICD-10-CM

## 2024-07-08 DIAGNOSIS — J90 RECURRENT PLEURAL EFFUSION ON RIGHT: ICD-10-CM

## 2024-07-08 PROCEDURE — 99215 OFFICE O/P EST HI 40 MIN: CPT | Performed by: INTERNAL MEDICINE

## 2024-07-08 PROCEDURE — 99213 OFFICE O/P EST LOW 20 MIN: CPT | Performed by: INTERNAL MEDICINE

## 2024-07-08 PROCEDURE — 3078F DIAST BP <80 MM HG: CPT | Performed by: INTERNAL MEDICINE

## 2024-07-08 PROCEDURE — 3075F SYST BP GE 130 - 139MM HG: CPT | Performed by: INTERNAL MEDICINE

## 2024-07-08 RX ORDER — TRAMADOL HYDROCHLORIDE 50 MG/1
50 TABLET ORAL EVERY 6 HOURS PRN
COMMUNITY

## 2024-07-08 ASSESSMENT — ENCOUNTER SYMPTOMS
HEARTBURN: 0
COUGH: 1
DEPRESSION: 0
FOCAL WEAKNESS: 0
NAUSEA: 0
CHILLS: 0
FEVER: 0
SHORTNESS OF BREATH: 1
DIZZINESS: 0
SPUTUM PRODUCTION: 0

## 2024-07-08 ASSESSMENT — FIBROSIS 4 INDEX: FIB4 SCORE: 0.79

## 2024-07-15 ENCOUNTER — HOSPITAL ENCOUNTER (OUTPATIENT)
Dept: RADIOLOGY | Facility: MEDICAL CENTER | Age: 69
End: 2024-07-15
Attending: INTERNAL MEDICINE
Payer: MEDICARE

## 2024-07-15 DIAGNOSIS — R06.02 SHORTNESS OF BREATH: ICD-10-CM

## 2024-07-15 PROCEDURE — 71250 CT THORAX DX C-: CPT

## 2024-07-17 ENCOUNTER — HOSPITAL ENCOUNTER (OUTPATIENT)
Dept: CARDIOLOGY | Facility: MEDICAL CENTER | Age: 69
End: 2024-07-17
Attending: STUDENT IN AN ORGANIZED HEALTH CARE EDUCATION/TRAINING PROGRAM
Payer: MEDICARE

## 2024-07-17 DIAGNOSIS — R06.09 OTHER FORMS OF DYSPNEA: ICD-10-CM

## 2024-07-17 LAB
LV EJECT FRACT MOD 2C 99903: 58.53
LV EJECT FRACT MOD 4C 99902: 72.44
LV EJECT FRACT MOD BP 99901: 64.91

## 2024-07-17 PROCEDURE — 93306 TTE W/DOPPLER COMPLETE: CPT | Mod: 26 | Performed by: INTERNAL MEDICINE

## 2024-07-17 PROCEDURE — 93306 TTE W/DOPPLER COMPLETE: CPT

## 2024-08-26 ENCOUNTER — TELEPHONE (OUTPATIENT)
Dept: HEALTH INFORMATION MANAGEMENT | Facility: OTHER | Age: 69
End: 2024-08-26
Payer: MEDICARE

## 2024-09-10 ENCOUNTER — NON-PROVIDER VISIT (OUTPATIENT)
Dept: SLEEP MEDICINE | Facility: MEDICAL CENTER | Age: 69
End: 2024-09-10
Attending: INTERNAL MEDICINE
Payer: MEDICARE

## 2024-09-10 VITALS — HEIGHT: 61 IN | BODY MASS INDEX: 23.01 KG/M2 | WEIGHT: 121.9 LBS

## 2024-09-10 DIAGNOSIS — R06.02 SHORTNESS OF BREATH: ICD-10-CM

## 2024-09-10 PROCEDURE — 94060 EVALUATION OF WHEEZING: CPT | Mod: 26 | Performed by: INTERNAL MEDICINE

## 2024-09-10 PROCEDURE — 94060 EVALUATION OF WHEEZING: CPT | Performed by: INTERNAL MEDICINE

## 2024-09-10 PROCEDURE — 94729 DIFFUSING CAPACITY: CPT | Performed by: INTERNAL MEDICINE

## 2024-09-10 PROCEDURE — 94729 DIFFUSING CAPACITY: CPT | Mod: 26 | Performed by: INTERNAL MEDICINE

## 2024-09-10 PROCEDURE — 94726 PLETHYSMOGRAPHY LUNG VOLUMES: CPT | Mod: 26 | Performed by: INTERNAL MEDICINE

## 2024-09-10 PROCEDURE — 94726 PLETHYSMOGRAPHY LUNG VOLUMES: CPT | Performed by: INTERNAL MEDICINE

## 2024-09-10 ASSESSMENT — FIBROSIS 4 INDEX: FIB4 SCORE: 0.8

## 2024-09-10 NOTE — PROCEDURES
Tech: Marisel Thornton CRT  Good patient effort & cooperation.  Test was performed on the [employer] Body Plethysmograph- Elite DX system.  The predicted sets used for Spirometry are GLI-2012, for Lung Volumes are ITS, and for DLCO is GLI 2017.  The results of this test meet the ATS standards for acceptability and repeatability.  The DLCO was uncorrected for Hb.  A bronchodilator of Ventolin HFA 2 puffs via spacer was administered.  DLCO was performed during dilation period.    Interpretation:  The patient has an obstructive ventilatory defect which is moderate (60-69%) by percent predicted FEV1.  Negative bronchodilator response.  Flow volume loops are consistent with spirometric data.  Full lung volumes demonstrate air trapping.  The DLCO is mildly reduced.    Summary:  An obstructive defect on spirometry can be consistent with many obstructive lung disease including, but not limited to asthma, emphysema, chronic bronchitis, acute bronchitis, post viral airway hyper-reactivity, pulmonary sarcoidosis, and bronchiolitis.    There is a mild decrease in DLCO.  Correlate this with a recent hemoglobin level.  Anemia can cause a mild decrease in DLCO.  Additionally, correlate with physical exam.  If peripheral edema is noted on exam, correlate with right ventricular pressure estimation with ECHO.  Additionally, parenchymal lung disease such as emphysema and interstitial disease can cause a decrease in DLCO.  Correlate with imaging.    I, Odell Mcghee DO, am the author of this note.     Odell Mcghee DO  Staff Pulmonologist and Intensivist  [facility]     Please note that this dictation was created using voice recognition software. The accuracy of the dictation is limited to the abilities of the software. I have made every reasonable attempt to correct obvious errors, but I expect that there are errors of grammar and possibly content that I did not discover before finalizing the note.

## 2024-10-03 ENCOUNTER — HOSPITAL ENCOUNTER (OUTPATIENT)
Dept: LAB | Facility: MEDICAL CENTER | Age: 69
End: 2024-10-03
Attending: STUDENT IN AN ORGANIZED HEALTH CARE EDUCATION/TRAINING PROGRAM
Payer: MEDICARE

## 2024-10-03 ENCOUNTER — HOSPITAL ENCOUNTER (OUTPATIENT)
Dept: LAB | Facility: MEDICAL CENTER | Age: 69
End: 2024-10-03
Attending: SPECIALIST
Payer: MEDICARE

## 2024-10-03 LAB
ALBUMIN SERPL BCP-MCNC: 4.5 G/DL (ref 3.2–4.9)
ALBUMIN/GLOB SERPL: 1.4 G/DL
ALP SERPL-CCNC: 72 U/L (ref 30–99)
ALT SERPL-CCNC: 20 U/L (ref 2–50)
ANION GAP SERPL CALC-SCNC: 14 MMOL/L (ref 7–16)
AST SERPL-CCNC: 23 U/L (ref 12–45)
BASOPHILS # BLD AUTO: 0.3 % (ref 0–1.8)
BASOPHILS # BLD: 0.05 K/UL (ref 0–0.12)
BILIRUB SERPL-MCNC: 0.2 MG/DL (ref 0.1–1.5)
BUN SERPL-MCNC: 17 MG/DL (ref 8–22)
CALCIUM ALBUM COR SERPL-MCNC: 9.6 MG/DL (ref 8.5–10.5)
CALCIUM SERPL-MCNC: 10 MG/DL (ref 8.5–10.5)
CHLORIDE SERPL-SCNC: 96 MMOL/L (ref 96–112)
CO2 SERPL-SCNC: 24 MMOL/L (ref 20–33)
CREAT SERPL-MCNC: 0.6 MG/DL (ref 0.5–1.4)
CRP SERPL HS-MCNC: <0.3 MG/DL (ref 0–0.75)
EOSINOPHIL # BLD AUTO: 0.03 K/UL (ref 0–0.51)
EOSINOPHIL NFR BLD: 0.2 % (ref 0–6.9)
ERYTHROCYTE [DISTWIDTH] IN BLOOD BY AUTOMATED COUNT: 59.5 FL (ref 35.9–50)
ERYTHROCYTE [SEDIMENTATION RATE] IN BLOOD BY WESTERGREN METHOD: 16 MM/HOUR (ref 0–25)
ESTRADIOL SERPL-MCNC: <5 PG/ML
GFR SERPLBLD CREATININE-BSD FMLA CKD-EPI: 97 ML/MIN/1.73 M 2
GLOBULIN SER CALC-MCNC: 3.3 G/DL (ref 1.9–3.5)
GLUCOSE SERPL-MCNC: 97 MG/DL (ref 65–99)
HCT VFR BLD AUTO: 40.7 % (ref 37–47)
HGB BLD-MCNC: 13.8 G/DL (ref 12–16)
IMM GRANULOCYTES # BLD AUTO: 0.09 K/UL (ref 0–0.11)
IMM GRANULOCYTES NFR BLD AUTO: 0.6 % (ref 0–0.9)
LYMPHOCYTES # BLD AUTO: 1.85 K/UL (ref 1–4.8)
LYMPHOCYTES NFR BLD: 12.5 % (ref 22–41)
MCH RBC QN AUTO: 33.2 PG (ref 27–33)
MCHC RBC AUTO-ENTMCNC: 33.9 G/DL (ref 32.2–35.5)
MCV RBC AUTO: 97.8 FL (ref 81.4–97.8)
MONOCYTES # BLD AUTO: 0.45 K/UL (ref 0–0.85)
MONOCYTES NFR BLD AUTO: 3 % (ref 0–13.4)
NEUTROPHILS # BLD AUTO: 12.31 K/UL (ref 1.82–7.42)
NEUTROPHILS NFR BLD: 83.4 % (ref 44–72)
NRBC # BLD AUTO: 0 K/UL
NRBC BLD-RTO: 0 /100 WBC (ref 0–0.2)
PLATELET # BLD AUTO: 455 K/UL (ref 164–446)
PMV BLD AUTO: 9.1 FL (ref 9–12.9)
POTASSIUM SERPL-SCNC: 4.2 MMOL/L (ref 3.6–5.5)
PROT SERPL-MCNC: 7.8 G/DL (ref 6–8.2)
RBC # BLD AUTO: 4.16 M/UL (ref 4.2–5.4)
SODIUM SERPL-SCNC: 134 MMOL/L (ref 135–145)
WBC # BLD AUTO: 14.8 K/UL (ref 4.8–10.8)

## 2024-10-03 PROCEDURE — 36415 COLL VENOUS BLD VENIPUNCTURE: CPT

## 2024-10-03 PROCEDURE — 80053 COMPREHEN METABOLIC PANEL: CPT

## 2024-10-03 PROCEDURE — 85652 RBC SED RATE AUTOMATED: CPT

## 2024-10-03 PROCEDURE — 86140 C-REACTIVE PROTEIN: CPT

## 2024-10-03 PROCEDURE — 85025 COMPLETE CBC W/AUTO DIFF WBC: CPT

## 2024-10-03 PROCEDURE — 84403 ASSAY OF TOTAL TESTOSTERONE: CPT

## 2024-10-03 PROCEDURE — 82670 ASSAY OF TOTAL ESTRADIOL: CPT

## 2024-10-08 LAB — TESTOST SERPL-MCNC: 66 NG/DL (ref 5–32)

## 2024-10-09 ENCOUNTER — APPOINTMENT (OUTPATIENT)
Dept: SLEEP MEDICINE | Facility: MEDICAL CENTER | Age: 69
End: 2024-10-09
Attending: NURSE PRACTITIONER
Payer: MEDICARE

## 2024-11-26 ENCOUNTER — APPOINTMENT (OUTPATIENT)
Dept: RADIOLOGY | Facility: IMAGING CENTER | Age: 69
End: 2024-11-26
Attending: PHYSICIAN ASSISTANT
Payer: MEDICARE

## 2024-11-26 ENCOUNTER — OFFICE VISIT (OUTPATIENT)
Dept: URGENT CARE | Facility: PHYSICIAN GROUP | Age: 69
End: 2024-11-26
Payer: MEDICARE

## 2024-11-26 VITALS
TEMPERATURE: 97.3 F | DIASTOLIC BLOOD PRESSURE: 60 MMHG | HEIGHT: 61 IN | HEART RATE: 79 BPM | RESPIRATION RATE: 16 BRPM | WEIGHT: 131 LBS | BODY MASS INDEX: 24.73 KG/M2 | OXYGEN SATURATION: 94 % | SYSTOLIC BLOOD PRESSURE: 106 MMHG

## 2024-11-26 DIAGNOSIS — J94.1 PLEURAL FIBROSIS: ICD-10-CM

## 2024-11-26 DIAGNOSIS — R06.02 SOB (SHORTNESS OF BREATH): ICD-10-CM

## 2024-11-26 DIAGNOSIS — R05.3 CHRONIC COUGH: ICD-10-CM

## 2024-11-26 PROCEDURE — 99214 OFFICE O/P EST MOD 30 MIN: CPT | Performed by: PHYSICIAN ASSISTANT

## 2024-11-26 PROCEDURE — 3078F DIAST BP <80 MM HG: CPT | Performed by: PHYSICIAN ASSISTANT

## 2024-11-26 PROCEDURE — 3074F SYST BP LT 130 MM HG: CPT | Performed by: PHYSICIAN ASSISTANT

## 2024-11-26 RX ORDER — PREDNISONE 1 MG/1
TABLET ORAL
COMMUNITY
Start: 2024-10-17

## 2024-11-26 RX ORDER — METHOTREXATE 2.5 MG/1
TABLET ORAL
COMMUNITY

## 2024-11-26 RX ORDER — IPRATROPIUM BROMIDE 21 UG/1
SPRAY, METERED NASAL
COMMUNITY
Start: 2024-11-03

## 2024-11-26 RX ORDER — PREDNISONE 5 MG/1
TABLET ORAL
COMMUNITY
Start: 2024-09-18

## 2024-11-26 RX ORDER — ALBUTEROL SULFATE 90 UG/1
2 INHALANT RESPIRATORY (INHALATION) EVERY 6 HOURS PRN
Qty: 8.5 G | Refills: 0 | Status: SHIPPED | OUTPATIENT
Start: 2024-11-26

## 2024-11-26 RX ORDER — CELECOXIB 200 MG/1
CAPSULE ORAL
COMMUNITY

## 2024-11-26 RX ORDER — POTASSIUM CHLORIDE 1500 MG/1
TABLET, EXTENDED RELEASE ORAL
COMMUNITY

## 2024-11-26 RX ORDER — PREDNISONE 20 MG/1
TABLET ORAL
COMMUNITY
Start: 2024-09-02

## 2024-11-26 RX ORDER — FOLIC ACID 1 MG/1
TABLET ORAL
COMMUNITY

## 2024-11-26 RX ORDER — BENZONATATE 200 MG/1
200 CAPSULE ORAL 3 TIMES DAILY PRN
Qty: 30 CAPSULE | Refills: 0 | Status: SHIPPED | OUTPATIENT
Start: 2024-11-26

## 2024-11-26 RX ORDER — FUROSEMIDE 20 MG/1
TABLET ORAL
COMMUNITY

## 2024-11-26 RX ORDER — GABAPENTIN 100 MG/1
CAPSULE ORAL
COMMUNITY

## 2024-11-26 RX ORDER — SPIRONOLACTONE 25 MG/1
TABLET ORAL
COMMUNITY
Start: 2024-11-20

## 2024-11-26 ASSESSMENT — ENCOUNTER SYMPTOMS
ABDOMINAL PAIN: 0
SORE THROAT: 0
SHORTNESS OF BREATH: 1
FEVER: 0
SPUTUM PRODUCTION: 1
COUGH: 1
PALPITATIONS: 0
MYALGIAS: 0
DIZZINESS: 0
NAUSEA: 0
ORTHOPNEA: 0
CHILLS: 0
VOMITING: 0
HEADACHES: 0

## 2024-11-26 ASSESSMENT — FIBROSIS 4 INDEX: FIB4 SCORE: 0.78

## 2024-11-26 NOTE — PROGRESS NOTES
Subjective:     CHIEF COMPLAINT  Chief Complaint   Patient presents with    Shortness of Breath     Cough, couple of weeks        HPI  Cyndee Mosley is a very pleasant 69 y.o. female with a past medical history significant for recurrent pleural effusions, pleural fibrosis and rheumatoid arthritis presenting to the clinic with a cough and shortness of breath ongoing for the last 2-3 weeks.  Cough predominantly present at night and in the morning when lying down.  Productive of clear sputum.  States she feels short of breath after coughing fits.  Denies any shortness of breath with exertion.  No orthopnea.  No chest pain or lower extremity edema.  She is currently followed by pulmonology.  In January she underwent thorascopy with decortication and tolerated this well.  She had follow-up with her pulmonologist most recently in July where a repeat CT scan was preformed which showed improvement to her chronic pleural effusions however not full resolution.  She has now established with rheumatology and is tapering off prednisone currently on 1 mg daily.  Has been on prednisone for the last 6 months.  Also started methotrexate.  No recent fevers.  No body aches or chills.    REVIEW OF SYSTEMS  Review of Systems   Constitutional:  Negative for chills, fever and malaise/fatigue.   HENT:  Positive for congestion. Negative for sore throat.    Respiratory:  Positive for cough, sputum production and shortness of breath.    Cardiovascular:  Negative for chest pain, palpitations, orthopnea and leg swelling.   Gastrointestinal:  Negative for abdominal pain, nausea and vomiting.   Musculoskeletal:  Negative for myalgias.   Neurological:  Negative for dizziness and headaches.       PAST MEDICAL HISTORY  Patient Active Problem List    Diagnosis Date Noted    Rheumatoid arthritis with positive rheumatoid factor (HCC) 07/08/2024    Seasonal allergies 07/08/2024    Shortness of breath 12/19/2023    Tick bite 11/30/2023    Low TSH  level 10/13/2023    Recurrent pleural effusion on right 10/05/2023    Acute hypoxemic respiratory failure (HCC) 10/04/2023    Tobacco abuse 10/04/2023    ACP (advance care planning) 10/04/2023    Acute exacerbation of chronic obstructive pulmonary disease (COPD) (HCC) 10/04/2023       SURGICAL HISTORY   has a past surgical history that includes breast reconstruction (Bilateral); knee arthroscopy (Right); cataract extraction with iol (Left); and thoracoscopy,dx no bx (1/22/2024).    ALLERGIES  Allergies   Allergen Reactions    Sulfa Drugs Rash     Rash         CURRENT MEDICATIONS  Home Medications       Reviewed by Billy Sarmiento P.A.-C. (Physician Assistant) on 11/26/24 at 1120  Med List Status: <None>     Medication Last Dose Status   albuterol 108 (90 Base) MCG/ACT Aero Soln inhalation aerosol PRN Active   Ascorbic Acid (VITAMIN C PO) Taking Active   B Complex Vitamins (VITAMIN B COMPLEX) Tab  Active   celecoxib (CELEBREX) 200 MG Cap Taking Active   Cholecalciferol (VITAMIN D3 PO) Taking Active   diclofenac sodium (VOLTAREN) 1 % Gel Not Taking Active   folic acid (FOLVITE) 1 MG Tab Taking Active   furosemide (LASIX) 20 MG Tab Not Taking Active   gabapentin (NEURONTIN) 100 MG Cap Not Taking Active   ipratropium (ATROVENT) 0.03 % Solution PRN Active   methotrexate 2.5 MG tablet Taking Active   Misc Natural Products (PUMPKIN SEED OIL PO) Taking Active   Multiple Vitamins-Minerals (AIRBORNE GUMMIES PO) Taking Active   Multiple Vitamins-Minerals (HAIR SKIN AND NAILS FORMULA) Tab Taking Active   Omega-3 Fatty Acids (FISH OIL PO) Taking Active   potassium chloride SA (KDUR) 20 MEQ Tab CR Taking Active   predniSONE (DELTASONE) 1 MG Tab Taking Active   predniSONE (DELTASONE) 20 MG Tab Not Taking Active   predniSONE (DELTASONE) 5 MG Tab Not Taking Active   Prenatal Vit-Fe Fumarate-FA (PRENATAL PO) Taking Active   spironolactone (ALDACTONE) 25 MG Tab Taking Active   traMADol (ULTRAM) 50 MG Tab  Active               "      SOCIAL HISTORY  Social History     Tobacco Use    Smoking status: Former     Current packs/day: 0.00     Average packs/day: 1 pack/day for 30.3 years (30.3 ttl pk-yrs)     Types: Cigarettes     Start date: 1993     Quit date: 10/4/2023     Years since quittin.1    Smokeless tobacco: Never   Vaping Use    Vaping status: Never Used   Substance and Sexual Activity    Alcohol use: Yes     Alcohol/week: 8.4 oz     Types: 14 Cans of beer per week    Drug use: Yes     Types: Marijuana, Oral     Comment: THC/CBD gummies every other day    Sexual activity: Not on file       FAMILY HISTORY  Family History   Problem Relation Age of Onset    Cancer Mother         lymphoma    No Known Problems Father           Objective:     VITAL SIGNS: /60 (BP Location: Right arm, Patient Position: Sitting, BP Cuff Size: Adult)   Pulse 79   Temp 36.3 °C (97.3 °F) (Temporal)   Resp 16   Ht 1.549 m (5' 1\")   Wt 59.4 kg (131 lb)   SpO2 94%   BMI 24.75 kg/m²     PHYSICAL EXAM  Physical Exam  Constitutional:       General: She is not in acute distress.     Appearance: Normal appearance. She is not ill-appearing, toxic-appearing or diaphoretic.   HENT:      Head: Normocephalic and atraumatic.      Right Ear: Tympanic membrane, ear canal and external ear normal.      Left Ear: Tympanic membrane, ear canal and external ear normal.      Nose: Congestion present. No rhinorrhea.      Mouth/Throat:      Mouth: Mucous membranes are moist.      Pharynx: No oropharyngeal exudate or posterior oropharyngeal erythema.   Eyes:      Conjunctiva/sclera: Conjunctivae normal.   Cardiovascular:      Rate and Rhythm: Normal rate and regular rhythm.      Pulses: Normal pulses.      Heart sounds: Normal heart sounds.   Pulmonary:      Effort: Pulmonary effort is normal.      Breath sounds: Normal breath sounds. No wheezing or rhonchi.      Comments: Decreased lung sounds in all fields.  Musculoskeletal:      Cervical back: Normal range of " motion. No muscular tenderness.   Lymphadenopathy:      Cervical: No cervical adenopathy.   Skin:     General: Skin is warm and dry.      Capillary Refill: Capillary refill takes less than 2 seconds.   Neurological:      Mental Status: She is alert.   Psychiatric:         Mood and Affect: Mood normal.         Thought Content: Thought content normal.     RADIOLOGY RESULTS   DX-CHEST-2 VIEWS    Result Date: 11/26/2024 11/26/2024 11:30 AM HISTORY/REASON FOR EXAM:  Shortness of breath for 2 weeks TECHNIQUE/EXAM DESCRIPTION AND NUMBER OF VIEWS: Two views of the chest. COMPARISON:  7/1/2024. FINDINGS: LUNGS: Clear. No effusions. PNEUMOTHORAX: None. LINES AND TUBES: None. MEDIASTINUM: No cardiomegaly. Atherosclerosis. MUSCULOSKELETAL STRUCTURES: No acute displaced fracture.     No acute cardiopulmonary abnormality.         Assessment/Plan:     1. SOB (shortness of breath)  DX-CHEST-2 VIEWS    benzonatate (TESSALON) 200 MG capsule    albuterol 108 (90 Base) MCG/ACT Aero Soln inhalation aerosol      2. Chronic cough  DX-CHEST-2 VIEWS    benzonatate (TESSALON) 200 MG capsule    albuterol 108 (90 Base) MCG/ACT Aero Soln inhalation aerosol      3. Pleural fibrosis            MDM/Comments:    Very pleasant and well-appearing 69-year-old female presented to the clinic with cough and shortness of breath ongoing for the last few weeks.  Patient has a long history of pulmonary complications.  Has a history of pleural fibrosis and is followed by pulmonology.  States over the last few weeks she has had a cough that is productive of clear sputum.  Occasionally feels short of breath after coughing fits.  On exam lung sounds are decreased diffusely.  No rhonchi or rales noted.  Chest x-ray performed without any acute abnormalities noted.  Vitals are stable and reassuring.  Tessalon provided for the cough.  Albuterol refill also provided.  No signs of secondary bacterial infection present.  Encourage scheduling a follow-up appointment  with pulmonology for reevaluation.    Differential diagnosis, natural history, supportive care, and indications for immediate follow-up discussed. All questions answered. Patient agrees with the plan of care.    Follow-up as needed if symptoms worsen or fail to improve to PCP, Urgent care or Emergency Room.    I have personally reviewed prior external notes and test results pertinent to today's visit.  I have independently reviewed and interpreted all diagnostics ordered during this urgent care acute visit.   Discussed management options (risks,benefits, and alternatives to treatment). Pt expresses understanding and the treatment plan was agreed upon. Questions were encouraged and answered to pt's satisfaction.    Please note that this dictation was created using voice recognition software. I have made a reasonable attempt to correct obvious errors, but I expect that there are errors of grammar and possibly content that I did not discover before finalizing the note.   Acitretin Pregnancy And Lactation Text: This medication is Pregnancy Category X and should not be given to women who are pregnant or may become pregnant in the future. This medication is excreted in breast milk.

## 2025-01-20 ENCOUNTER — OFFICE VISIT (OUTPATIENT)
Dept: SLEEP MEDICINE | Facility: MEDICAL CENTER | Age: 70
End: 2025-01-20
Attending: STUDENT IN AN ORGANIZED HEALTH CARE EDUCATION/TRAINING PROGRAM
Payer: MEDICARE

## 2025-01-20 ENCOUNTER — HOSPITAL ENCOUNTER (OUTPATIENT)
Dept: LAB | Facility: MEDICAL CENTER | Age: 70
End: 2025-01-20
Attending: SPECIALIST
Payer: MEDICARE

## 2025-01-20 VITALS
DIASTOLIC BLOOD PRESSURE: 62 MMHG | HEIGHT: 61 IN | SYSTOLIC BLOOD PRESSURE: 114 MMHG | WEIGHT: 131 LBS | HEART RATE: 80 BPM | BODY MASS INDEX: 24.73 KG/M2 | OXYGEN SATURATION: 95 %

## 2025-01-20 DIAGNOSIS — J43.2 CENTRILOBULAR EMPHYSEMA (HCC): ICD-10-CM

## 2025-01-20 DIAGNOSIS — Z72.0 TOBACCO ABUSE: ICD-10-CM

## 2025-01-20 DIAGNOSIS — J44.9 CHRONIC OBSTRUCTIVE PULMONARY DISEASE, UNSPECIFIED COPD TYPE (HCC): ICD-10-CM

## 2025-01-20 DIAGNOSIS — M05.9 RHEUMATOID ARTHRITIS WITH POSITIVE RHEUMATOID FACTOR, INVOLVING UNSPECIFIED SITE (HCC): ICD-10-CM

## 2025-01-20 DIAGNOSIS — J90 RECURRENT PLEURAL EFFUSION ON RIGHT: ICD-10-CM

## 2025-01-20 DIAGNOSIS — J42 CHRONIC BRONCHITIS, UNSPECIFIED CHRONIC BRONCHITIS TYPE (HCC): ICD-10-CM

## 2025-01-20 LAB
BASOPHILS # BLD AUTO: 0.8 % (ref 0–1.8)
BASOPHILS # BLD: 0.1 K/UL (ref 0–0.12)
EOSINOPHIL # BLD AUTO: 0.49 K/UL (ref 0–0.51)
EOSINOPHIL NFR BLD: 3.9 % (ref 0–6.9)
ERYTHROCYTE [DISTWIDTH] IN BLOOD BY AUTOMATED COUNT: 49.4 FL (ref 35.9–50)
HCT VFR BLD AUTO: 41.7 % (ref 37–47)
HGB BLD-MCNC: 13.8 G/DL (ref 12–16)
IMM GRANULOCYTES # BLD AUTO: 0.04 K/UL (ref 0–0.11)
IMM GRANULOCYTES NFR BLD AUTO: 0.3 % (ref 0–0.9)
LYMPHOCYTES # BLD AUTO: 3.77 K/UL (ref 1–4.8)
LYMPHOCYTES NFR BLD: 30.1 % (ref 22–41)
MCH RBC QN AUTO: 33.5 PG (ref 27–33)
MCHC RBC AUTO-ENTMCNC: 33.1 G/DL (ref 32.2–35.5)
MCV RBC AUTO: 101.2 FL (ref 81.4–97.8)
MONOCYTES # BLD AUTO: 1.18 K/UL (ref 0–0.85)
MONOCYTES NFR BLD AUTO: 9.4 % (ref 0–13.4)
NEUTROPHILS # BLD AUTO: 6.93 K/UL (ref 1.82–7.42)
NEUTROPHILS NFR BLD: 55.5 % (ref 44–72)
NRBC # BLD AUTO: 0 K/UL
NRBC BLD-RTO: 0 /100 WBC (ref 0–0.2)
PLATELET # BLD AUTO: 390 K/UL (ref 164–446)
PMV BLD AUTO: 10 FL (ref 9–12.9)
RBC # BLD AUTO: 4.12 M/UL (ref 4.2–5.4)
WBC # BLD AUTO: 12.5 K/UL (ref 4.8–10.8)

## 2025-01-20 PROCEDURE — 99211 OFF/OP EST MAY X REQ PHY/QHP: CPT | Performed by: STUDENT IN AN ORGANIZED HEALTH CARE EDUCATION/TRAINING PROGRAM

## 2025-01-20 PROCEDURE — 99214 OFFICE O/P EST MOD 30 MIN: CPT | Performed by: STUDENT IN AN ORGANIZED HEALTH CARE EDUCATION/TRAINING PROGRAM

## 2025-01-20 PROCEDURE — 3078F DIAST BP <80 MM HG: CPT | Performed by: STUDENT IN AN ORGANIZED HEALTH CARE EDUCATION/TRAINING PROGRAM

## 2025-01-20 PROCEDURE — 3074F SYST BP LT 130 MM HG: CPT | Performed by: STUDENT IN AN ORGANIZED HEALTH CARE EDUCATION/TRAINING PROGRAM

## 2025-01-20 PROCEDURE — 85025 COMPLETE CBC W/AUTO DIFF WBC: CPT

## 2025-01-20 PROCEDURE — 36415 COLL VENOUS BLD VENIPUNCTURE: CPT

## 2025-01-20 ASSESSMENT — FIBROSIS 4 INDEX: FIB4 SCORE: 0.78

## 2025-01-21 NOTE — ASSESSMENT & PLAN NOTE
Secondary to tobacco use, GOLD  class IIA  No recent exacerbation, CAT Score 9  Reports improvement with using Breztri inhaler  CT consistent with emphysema, and post pleurodesis changes    -Will start Stiolto 2 puffs daily given her low Score, no recent exacerbation      Orders:    Tiotropium Bromide-Olodaterol 2.5-2.5 MCG/ACT Aero Soln; Inhale 2 Puffs every day.

## 2025-01-21 NOTE — ASSESSMENT & PLAN NOTE
Quit date 2023  Started smoking at the age of 20, quit age 68, at least 48 pack years  Last CT scan is July 2024  -We will repeat CT scan in 2025  -Will need yearly lung cancer screening low-dose CT scan  -Patient agrees with referral to lung cancer screening program in the future

## 2025-01-21 NOTE — ASSESSMENT & PLAN NOTE
Status post pleuroscopy/pleurodesis by Dr. Bonds  Repeat imaging looks stable with post surgical changes

## 2025-01-21 NOTE — ASSESSMENT & PLAN NOTE
Secondary to tobacco use, GOLD  class IIA  No recent exacerbation, CAT Score 9  Reports improvement with using Breztri inhaler  CT consistent with emphysema, and post pleurodesis changes    -Will start Stiolto 2 puffs daily given her low Score, no recent exacerbation

## 2025-01-21 NOTE — PROGRESS NOTES
Pulmonary Clinic follow up    Date of Service: 1/20/2025    Reason for follow up:  Follow-Up (SOB, Rheumatoid arthritis with positive rheumatoid factor. Last seen 07/08/24) and Results (/DX-Chest 11/26/24, PFT 09/10/24, CT CHEST 07/15/24/)    History of Present Illness: Cyndee Mosley is a 69 y.o. female former smoker quit 2023 ,with a medical history of rheumatoid arthritis, recurrent unilateral exudative pleural effusion status post pleurodesis in January 2024, seasonal allergies presents for follow-up of shortness of breath.  She has had shortness of breath for past few months, cough with white sputum production.  She last seen in the clinic in July 2024 and did get PFTs which showed moderate obstructive lung disease.  She has been off steroids for rheumatoid arthritis, has been compliant with her methotrexate and folate.   she started using her 's Breztri inhaler which improved her symptoms.  Her CT scan is consistent with emphysema.  She does not believe in COPD diagnosis however reports improvement with consistent use of inhalers.      Review of Systems   Constitutional:  Negative for chills and fever.   Respiratory: Positive for shortness of breath, cough, sputum production.   Cardiovascular:  Negative for chest pain and palpitations.   Gastrointestinal:  Negative for heartburn and nausea.   Genitourinary:  Negative for dysuria and urgency.   Neurological:  Negative for dizziness, sensory change, focal weakness and loss of consciousness.   All other systems reviewed and are negative.       Current Outpatient Medications:     Tiotropium Bromide-Olodaterol 2.5-2.5 MCG/ACT Aero Soln, Inhale 2 Puffs every day., Disp: 3 Each, Rfl: 3    celecoxib (CELEBREX) 200 MG Cap, TAKE 1 CAPSULE BY MOUTH TWICE A DAY WITH FOOD, Disp: , Rfl:     diclofenac sodium (VOLTAREN) 1 % Gel, , Disp: , Rfl:     folic acid (FOLVITE) 1 MG Tab, 1 tablet Orally Once a day for 30 days, Disp: , Rfl:     ipratropium (ATROVENT) 0.03 %  Solution, TAKE 1 SPRAY IN EACH NOSTRIL UP TO 3 TIMES A DAY AS NEEDED FOR NASAL DRIP, Disp: , Rfl:     methotrexate 2.5 MG tablet, TAKE 6 TABS BY MOUTH ONCE A WEEK ORAL 30 DAYS, Disp: , Rfl:     albuterol 108 (90 Base) MCG/ACT Aero Soln inhalation aerosol, Inhale 1-2 Puffs every 6 hours as needed for Shortness of Breath., Disp: , Rfl:     Prenatal Vit-Fe Fumarate-FA (PRENATAL PO), Take 1 Tablet by mouth every morning., Disp: , Rfl:     Ascorbic Acid (VITAMIN C PO), Take 2 Tablets by mouth every morning., Disp: , Rfl:     Multiple Vitamins-Minerals (AIRBORNE GUMMIES PO), Take 1 Tablet by mouth every morning., Disp: , Rfl:     B Complex Vitamins (VITAMIN B COMPLEX) Tab, Take 1 Tablet by mouth every morning., Disp: , Rfl:     Cholecalciferol (VITAMIN D3 PO), Take 1 Tablet by mouth every morning., Disp: , Rfl:     Misc Natural Products (PUMPKIN SEED OIL PO), Take 1 Tablet by mouth every morning., Disp: , Rfl:     Omega-3 Fatty Acids (FISH OIL PO), Take 1 Capsule by mouth every morning., Disp: , Rfl:     Multiple Vitamins-Minerals (HAIR SKIN AND NAILS FORMULA) Tab, Take 2 Tablets by mouth every morning., Disp: , Rfl:     traMADol (ULTRAM) 50 MG Tab, Take 50 mg by mouth every 6 hours as needed., Disp: , Rfl:      reports that she quit smoking about 15 months ago. Her smoking use included cigarettes. She started smoking about 31 years ago. She has a 30.3 pack-year smoking history. She has never used smokeless tobacco. She reports current alcohol use of about 8.4 oz of alcohol per week. She reports current drug use. Drugs: Marijuana and Oral.     Past Medical History:   Diagnosis Date    Cataract 2021    Cataract removed left eye    COPD (chronic obstructive pulmonary disease) (HCC)     Pneumonia 2018    Shortness of breath 12/19/2023    mild with exertion       Past Surgical History:   Procedure Laterality Date    OR THORACOSCOPY,DX NO BX  1/22/2024    Procedure: THORACOSCOPY WITH DECORTICATION;  Surgeon: Rolan Bonds,  "M.D.;  Location: SURGERY [facility];  Service: General    BREAST RECONSTRUCTION Bilateral     Breast implants    CATARACT EXTRACTION WITH IOL Left     KNEE ARTHROSCOPY Right     meniscus repair       Allergies: Patient has no known allergies.    family history includes Cancer in her mother; No Known Problems in her father.    /62 (BP Location: Right arm, Patient Position: Sitting, BP Cuff Size: Adult)   Pulse 80   Ht 1.549 m (5' 1\")   Wt 59.4 kg (131 lb)   SpO2 95%   BMI 24.75 kg/m²     Physical Examination  Vitals reviewed.   Constitutional:       Appearance: Normal appearance.   HENT:      Head: Normocephalic and atraumatic.      Mouth/Throat:      Mouth: Mucous membranes are moist.   Eyes:      Conjunctiva/sclera: Conjunctivae normal.      Pupils: Pupils are equal, round, and reactive to light.   Cardiovascular:      Rate and Rhythm: Normal rate and regular rhythm.   Pulmonary:      Effort: Pulmonary effort is normal. No respiratory distress.      Breath sounds: Normal breath sounds.   Abdominal:      General: There is no distension.      Palpations: Abdomen is soft. There is no mass.   Musculoskeletal:         General: No swelling.      Right lower leg: No edema.      Left lower leg: No edema.   Skin:     General: Skin is warm and dry.   Neurological:      General: No focal deficit present.      Mental Status: She is alert and oriented to person, place, and time.      Cranial Nerves: No cranial nerve deficit.      Motor: No weakness.   Psychiatric:         Mood and Affect: Mood normal.         Behavior: Behavior normal.         Results:    CT-CHEST (THORAX) W/O 07/15/2024    Narrative  7/15/2024 10:44 AM    HISTORY/REASON FOR EXAM:  History of right pleural effusion status post VATS, follow-up.      TECHNIQUE/EXAM DESCRIPTION:  CT scan of the chest without contrast.    Noncontrast helical scanning of the chest was obtained from the lung apices through the adrenal glands.    Low dose optimization " technique was utilized for this CT exam including automated exposure control and adjustment of the mA and/or kV according to patient size.    COMPARISON: 11/14/2023.    FINDINGS:  Lungs: Only trace right pleural effusion remains, predominantly in the right lower pleural space. It is somewhat loculated, with largest locule measuring 3 cm. Small left pleural effusion.    Emphysema. Mild bronchial wall thickening in the lower lobes. No suspicious pulmonary nodules or masses.    Mediastinum: Unremarkable thyroid. No mediastinal mass.    Nodes: No enlarged lymph nodes. Several prominent mediastinal nodes are considered reactive.    Heart: Not enlarged. No pericardial effusion. No coronary calcifications.    Aorta and great vessels: No aneurysm. . Atherosclerosis.    Musculoskeletal structures: No acute fracture or destructive lesion.    Upper abdomen: Cholelithiasis.    Bilateral breast implants.    Impression  1.  Trace residual right pleural effusion, located in the right lower pleural space. It is somewhat loculated, with largest locule measuring 3 cm.  2.  Small left pleural effusion.  3.  Emphysema.  4.  Cholelithiasis.    Fleischner Society pulmonary nodule recommendations:  Not Applicable      DX-CHEST-LIMITED (1 VIEW) 01/25/2024    Narrative  1/25/2024 5:50 AM    HISTORY/REASON FOR EXAM: assess chest tube placement    TECHNIQUE/EXAM DESCRIPTION:  Single AP view of the chest.    COMPARISON: Yesterday    FINDINGS:    Interval removal of right thoracostomy tube is seen. The cardiac silhouette appears within normal limits.    The mediastinal contour appears within normal limits.  The central pulmonary vasculature appears normal.    Bilateral lung volumes are diminished.  Hazy right pulmonary opacities are seen.    Mild blunting of the right costophrenic angle is seen suggesting small right effusion.    The bony structures appear age-appropriate.  Soft tissue gas in the right chest wall is seen.    Impression  1.   Hazy right pulmonary infiltrates, similar compared to prior study.  2.  Small right pleural effusion, stable.      DX-CHEST-LIMITED (1 VIEW) 2024    Narrative  2024 5:53 AM    HISTORY/REASON FOR EXAM: assess chest tube placement    TECHNIQUE/EXAM DESCRIPTION:  Single AP view of the chest.    COMPARISON: Yesterday    FINDINGS:    Position of medical devices appears stable. The cardiac silhouette appears within normal limits.    The mediastinal contour appears within normal limits.  The central pulmonary vasculature appears normal.    The lungs appear well expanded bilaterally.  Hazy right pulmonary opacities are seen.    Blunting of bilateral costophrenic angles is seen, compatible with trace bilateral pleural effusions.    The bony structures appear age-appropriate.  Soft tissue gas in the right chest wall is seen.    Impression  1.  Hazy right pulmonary infiltrates, increased on the right compared to prior study  2.  Trace bilateral pleural effusions, increased on the right since prior study    EC-ECHOCARDIOGRAM COMP W/O CONTRAST W/O MYOCARDIAL STRAIN IMG 2024    Narrative  Transthoracic  Echo Report      Echocardiography Laboratory    CONCLUSIONS  Compared to the prior study on 10/5/2023, tricuspid regurgitation is  new.  Normal left ventricular chamber size. Normal left ventricular wall  thickness. Normal left ventricular systolic function. Visually  estimated ejection fraction is 65-70 %. No regional wall motion  abnormalities. Normal diastolic function.  Mild tricuspid regurgitation with estimated RV systolic pressure of 38  mmHg    DARLING VERONICA  Exam Date:         2024  12:57  Exam Location:     Out Patient  Priority:          Routine    Ordering Physician:        MASON STEWART  Referring Physician:       [id number]  Sonographer:               Ketty Valle [facility]    Age:    68     Gender:    F  MRN:    [id number]  :    1955  BSA:    1.49   Ht (in):    61     Wt (lb):     115  Exam Type:     Complete    Indications:     Dyspnea  ICD Codes:       786.09    CPT Codes:       59394    BP:   130    /   66     HR:   74  Technical Quality:       Good    MEASUREMENTS  (Male / Female) Normal Values  2D ECHO  LV Diastolic Diameter PLAX        4.4 cm                4.2 - 5.9 / 3.9 - 5.3  cm  LV Systolic Diameter PLAX         2.7 cm                2.1 - 4.0 cm  IVS Diastolic Thickness           0.67 cm  LVPW Diastolic Thickness          0.83 cm  LVOT Diameter                     1.8 cm  LV Ejection Fraction MOD BP       64.9 %                >= 55  %  LV Ejection Fraction MOD 4C       72.4 %  LV Ejection Fraction MOD 2C       58.5 %  IVC Diameter                      1.4 cm    DOPPLER  AV Peak Velocity                  1.5 m/s  AV Peak Gradient                  9.4 mmHg  AV Mean Gradient                  4.8 mmHg  LVOT Peak Velocity                1.1 m/s  AV Area Cont Eq vti               1.7 cm2  Mitral E Point Velocity           0.63 m/s  Mitral E to A Ratio               0.89  MV Pressure Half Time             73.3 ms  MV Area PHT                       3 cm2  MV Deceleration Time              253 ms  TR Peak Velocity                  274 cm/s  PV Peak Velocity                  0.94 m/s  PV Peak Gradient                  3.6 mmHg    * Indicates values subject to auto-interpretation  LV EF:        %    FINDINGS  Left Ventricle  Normal left ventricular chamber size. Normal left ventricular wall  thickness. Normal left ventricular systolic function. Visually  estimated ejection fraction is 65-70 %. No regional wall motion  abnormalities. Normal diastolic function.    Right Ventricle  Normal right ventricular size and systolic function.    Right Atrium  Normal right atrial size. Normal inferior vena cava size and  inspiratory collapse.    Left Atrium  Normal left atrial size. Left atrial volume index is 30 mL/sq m.    Mitral Valve  Structurally normal mitral valve. No mitral stenosis. Trace  mitral  regurgitation.    Aortic Valve  Structurally normal aortic valve. No aortic valve stenosis. No aortic  insufficiency.    Tricuspid Valve  Structurally normal tricuspid valve. No tricuspid stenosis. Mild  tricuspid regurgitation. Right atrial pressure is estimated to be 3  mmHg. Right ventricular systolic pressure is estimated to be 35 mmHg.    Pulmonic Valve  The pulmonic valve is not well visualized. No pulmonic stenosis. No  pulmonic insufficiency.    Pericardium  No pericardial effusion.    Aorta  The ascending aorta diameter is 3.3 cm.                      Aden Cadena DO  (Electronically Signed)  Final Date:     2024  17:32      EC-ECHOCARDIOGRAM COMP W/O CONTRAST W/O MYOCARDIAL STRAIN IMG 10/05/2023    Narrative  Transthoracic  Echo Report      Echocardiography Laboratory    CONCLUSIONS  No prior study is available for comparison.  Normal left ventricular systolic function.  The left ventricular ejection fraction is visually estimated to be 65%.  No significant valvular abnormalities.    VERONICA DARLING  Exam Date:         10/05/2023  09:02  Exam Location:     Inpatient  Priority:          Routine    Ordering Physician:        AMINA CORTEZ  Referring Physician:       [id number] Dunbar  Sonographer:               Yen Hassan [facility]    Age:    68     Gender:    F  MRN:    [id number]  :    1955  BSA:    1.5    Ht (in):    61     Wt (lb):    117  Exam Type:     Complete, Color, Doppler    Indications:     Acute MI  ICD Codes:       410.9    CPT Codes:       94680    BP:   132    /   61     HR:   87  Technical Quality:       Good    MEASUREMENTS  (Male / Female) Normal Values  2D ECHO  LV Diastolic Diameter PLAX        3.6 cm                4.2 - 5.9 / 3.9 - 5.3  cm  LV Systolic Diameter PLAX         2.5 cm                2.1 - 4.0 cm  IVS Diastolic Thickness           0.9 cm  LVPW Diastolic Thickness          0.9 cm  LVOT Diameter                     1.8 cm  Estimated LV Ejection Fraction     65 %  LV Ejection Fraction MOD BP       67.7 %                >= 55  %  LV Ejection Fraction MOD 4C       60.9 %  LV Ejection Fraction MOD 2C       73.4 %  LV Ejection Fraction 4C AL        61.6 %  LV Ejection Fraction 2C AL        74.7 %  LA Volume Index                   25.2 cm3/m2           16 - 28 cm3/m2    DOPPLER  AV Peak Velocity                  1.6 m/s  AV Peak Gradient                  10.5 mmHg  AV Mean Gradient                  5 mmHg  LVOT Peak Velocity                1.1 m/s  AV Area Cont Eq vti               1.8 cm2  Mitral E Point Velocity           0.98 m/s  Mitral E to A Ratio               1.2  MV Pressure Half Time             63 ms  MV Area PHT                       3.5 cm2  MV Deceleration Time              215 ms  PV Peak Velocity                  1.1 m/s  PV Peak Gradient                  4.5 mmHg  RVOT Peak Velocity                0.78 m/s  LV E' Lateral Velocity            10.6 cm/s  Mitral E to LV E' Lateral Ratio   9.3  LV E' Septal Velocity             11.2 cm/s  Mitral E to LV E' Septal Ratio    8.8    * Indicates values subject to auto-interpretation  LV EF:  65    %    FINDINGS  Left Ventricle  Normal left ventricular chamber size. Normal left ventricular wall  thickness. Normal left ventricular systolic function. The left  ventricular ejection fraction is visually estimated to be 65%. Normal  diastolic function.    Right Ventricle  The right ventricle is normal in size and systolic function.    Right Atrium  The right atrium is normal in size. Normal inferior vena cava size and  inspiratory collapse.    Left Atrium  The left atrium is normal in size. Left atrial volume index is 24 mL/sq  m.    Mitral Valve  Structurally normal mitral valve without significant stenosis. Trace  mitral regurgitation.    Aortic Valve  Structurally normal aortic valve without significant stenosis or  regurgitation.    Tricuspid Valve  Structurally normal tricuspid valve without significant stenosis.  Trace  tricuspid regurgitation. Right atrial pressure is estimated to be 8  mmHg. Unable to estimate pulmonary artery pressure due to an inadequate  tricuspid regurgitant jet.    Pulmonic Valve  Structurally normal pulmonic valve without significant stenosis or  regurgitation.    Pericardium  No pericardial effusion.    Aorta  Normal aortic root for body surface area. The ascending aorta diameter  is 3.0cm.                      Nolan Rosado M.D.  (Electronically Signed)  Final Date:     05 October 2023  12:23     Lab Results   Component Value Date/Time    WBC 14.8 (H) 10/03/2024 11:22 AM    RBC 4.16 (L) 10/03/2024 11:22 AM    HEMOGLOBIN 13.8 10/03/2024 11:22 AM    PLATELETCT 455 (H) 10/03/2024 11:22 AM    EOSINOPHILS 0.20 10/03/2024 11:22 AM    EOSINOPHILS 1 11/03/2023 12:50 PM    NEUTS 12.31 (H) 10/03/2024 11:22 AM    LYMPHS 1.85 10/03/2024 11:22 AM    LYMPHS 16 11/03/2023 12:50 PM    MONOS 0.45 10/03/2024 11:22 AM    EOS 0.03 10/03/2024 11:22 AM    BASO 0.05 10/03/2024 11:22 AM    IMMGRANAB 0.09 10/03/2024 11:22 AM    NRBCAB 0.00 10/03/2024 11:22 AM       Pulmonary function tests      Assessment & Plan  Chronic bronchitis, unspecified chronic bronchitis type (HCC)  Secondary to tobacco use, GOLD  class IIA  No recent exacerbation, CAT Score 9  Reports improvement with using Breztri inhaler  CT consistent with emphysema, and post pleurodesis changes    -Will start Stiolto 2 puffs daily given her low Score, no recent exacerbation      Orders:    Tiotropium Bromide-Olodaterol 2.5-2.5 MCG/ACT Aero Soln; Inhale 2 Puffs every day.    Chronic obstructive pulmonary disease, unspecified COPD type (HCC)  Secondary to tobacco use, GOLD  class IIA  No recent exacerbation, CAT Score 9  Reports improvement with using Breztri inhaler  CT consistent with emphysema, and post pleurodesis changes    -Will start Stiolto 2 puffs daily given her low Score, no recent exacerbation           Tobacco abuse  Quit date 2023  Started  smoking at the age of 20, quit age 68, at least 48 pack years  Last CT scan is July 2024  -We will repeat CT scan in 2025  -Will need yearly lung cancer screening low-dose CT scan  -Patient agrees with referral to lung cancer screening program in the future         Rheumatoid arthritis with positive rheumatoid factor, involving unspecified site (HCC)  Well-controlled with her current regimen methotrexate, folate         Recurrent pleural effusion on right  Status post pleuroscopy/pleurodesis by Dr. Bonds  Repeat imaging looks stable with post surgical changes          Centrilobular emphysema (HCC)  See management under COPD            Sarah Bazan MD  [facility]

## 2025-02-03 DIAGNOSIS — J44.9 CHRONIC OBSTRUCTIVE PULMONARY DISEASE, UNSPECIFIED COPD TYPE (HCC): ICD-10-CM

## 2025-02-03 DIAGNOSIS — J42 CHRONIC BRONCHITIS, UNSPECIFIED CHRONIC BRONCHITIS TYPE (HCC): ICD-10-CM

## 2025-02-03 RX ORDER — GLYCOPYRROLATE AND FORMOTEROL FUMARATE 9; 4.8 UG/1; UG/1
2 AEROSOL, METERED RESPIRATORY (INHALATION) 2 TIMES DAILY
Qty: 10.7 G | Refills: 5 | Status: SHIPPED | OUTPATIENT
Start: 2025-02-03 | End: 2025-02-06

## 2025-02-05 NOTE — TELEPHONE ENCOUNTER
RE: [facility]      Pharmacy comment: Alternative Requested:THE PRESCRIBED MEDICATION IS NOT COVERED BY INSURANCE. PLEASE CONSIDER CHANGING TO ONE OF THE SUGGESTED COVERED ALTERNATIVE

## 2025-02-06 RX ORDER — UMECLIDINIUM BROMIDE AND VILANTEROL TRIFENATATE 62.5; 25 UG/1; UG/1
1 POWDER RESPIRATORY (INHALATION) DAILY
Qty: 1 EACH | Refills: 0 | Status: SHIPPED | OUTPATIENT
Start: 2025-02-06

## 2025-02-11 ENCOUNTER — HOSPITAL ENCOUNTER (OUTPATIENT)
Dept: LAB | Facility: MEDICAL CENTER | Age: 70
End: 2025-02-11
Attending: SPECIALIST
Payer: MEDICARE

## 2025-02-11 PROCEDURE — 80053 COMPREHEN METABOLIC PANEL: CPT

## 2025-02-11 PROCEDURE — 36415 COLL VENOUS BLD VENIPUNCTURE: CPT

## 2025-02-12 LAB
ALBUMIN SERPL BCP-MCNC: 4.5 G/DL (ref 3.2–4.9)
ALBUMIN/GLOB SERPL: 1.6 G/DL
ALP SERPL-CCNC: 95 U/L (ref 30–99)
ALT SERPL-CCNC: 18 U/L (ref 2–50)
ANION GAP SERPL CALC-SCNC: 12 MMOL/L (ref 7–16)
AST SERPL-CCNC: 27 U/L (ref 12–45)
BILIRUB SERPL-MCNC: 0.3 MG/DL (ref 0.1–1.5)
BUN SERPL-MCNC: 12 MG/DL (ref 8–22)
CALCIUM ALBUM COR SERPL-MCNC: 9.9 MG/DL (ref 8.5–10.5)
CALCIUM SERPL-MCNC: 10.3 MG/DL (ref 8.5–10.5)
CHLORIDE SERPL-SCNC: 100 MMOL/L (ref 96–112)
CO2 SERPL-SCNC: 25 MMOL/L (ref 20–33)
CREAT SERPL-MCNC: 0.65 MG/DL (ref 0.5–1.4)
GFR SERPLBLD CREATININE-BSD FMLA CKD-EPI: 95 ML/MIN/1.73 M 2
GLOBULIN SER CALC-MCNC: 2.9 G/DL (ref 1.9–3.5)
GLUCOSE SERPL-MCNC: 84 MG/DL (ref 65–99)
POTASSIUM SERPL-SCNC: 4.3 MMOL/L (ref 3.6–5.5)
PROT SERPL-MCNC: 7.4 G/DL (ref 6–8.2)
SODIUM SERPL-SCNC: 137 MMOL/L (ref 135–145)

## 2025-02-17 ENCOUNTER — HOSPITAL ENCOUNTER (OUTPATIENT)
Dept: RADIOLOGY | Facility: MEDICAL CENTER | Age: 70
End: 2025-02-17
Attending: STUDENT IN AN ORGANIZED HEALTH CARE EDUCATION/TRAINING PROGRAM
Payer: MEDICARE

## 2025-02-17 DIAGNOSIS — J90 RECURRENT PLEURAL EFFUSION ON RIGHT: ICD-10-CM

## 2025-02-17 PROCEDURE — 71250 CT THORAX DX C-: CPT

## 2025-05-28 ENCOUNTER — HOSPITAL ENCOUNTER (OUTPATIENT)
Dept: LAB | Facility: MEDICAL CENTER | Age: 70
End: 2025-05-28
Attending: SPECIALIST
Payer: MEDICARE

## 2025-05-28 LAB
25(OH)D3 SERPL-MCNC: 80 NG/ML (ref 30–100)
ALBUMIN SERPL BCP-MCNC: 4.6 G/DL (ref 3.2–4.9)
ALBUMIN/GLOB SERPL: 1.6 G/DL
ALP SERPL-CCNC: 99 U/L (ref 30–99)
ALT SERPL-CCNC: 12 U/L (ref 2–50)
ANION GAP SERPL CALC-SCNC: 11 MMOL/L (ref 7–16)
AST SERPL-CCNC: 25 U/L (ref 12–45)
BASOPHILS # BLD AUTO: 1 % (ref 0–1.8)
BASOPHILS # BLD: 0.11 K/UL (ref 0–0.12)
BILIRUB SERPL-MCNC: 0.3 MG/DL (ref 0.1–1.5)
BUN SERPL-MCNC: 16 MG/DL (ref 8–22)
CALCIUM ALBUM COR SERPL-MCNC: 9.6 MG/DL (ref 8.5–10.5)
CALCIUM SERPL-MCNC: 10.1 MG/DL (ref 8.5–10.5)
CHLORIDE SERPL-SCNC: 98 MMOL/L (ref 96–112)
CO2 SERPL-SCNC: 25 MMOL/L (ref 20–33)
CREAT SERPL-MCNC: 0.78 MG/DL (ref 0.5–1.4)
CRP SERPL HS-MCNC: 0.67 MG/DL (ref 0–0.75)
EOSINOPHIL # BLD AUTO: 0.83 K/UL (ref 0–0.51)
EOSINOPHIL NFR BLD: 7.9 % (ref 0–6.9)
ERYTHROCYTE [DISTWIDTH] IN BLOOD BY AUTOMATED COUNT: 52.4 FL (ref 35.9–50)
ERYTHROCYTE [SEDIMENTATION RATE] IN BLOOD BY WESTERGREN METHOD: 11 MM/HOUR (ref 0–25)
GFR SERPLBLD CREATININE-BSD FMLA CKD-EPI: 82 ML/MIN/1.73 M 2
GLOBULIN SER CALC-MCNC: 2.9 G/DL (ref 1.9–3.5)
GLUCOSE SERPL-MCNC: 67 MG/DL (ref 65–99)
HCT VFR BLD AUTO: 42.8 % (ref 37–47)
HGB BLD-MCNC: 14.2 G/DL (ref 12–16)
IMM GRANULOCYTES # BLD AUTO: 0.02 K/UL (ref 0–0.11)
IMM GRANULOCYTES NFR BLD AUTO: 0.2 % (ref 0–0.9)
LYMPHOCYTES # BLD AUTO: 3.76 K/UL (ref 1–4.8)
LYMPHOCYTES NFR BLD: 35.6 % (ref 22–41)
MCH RBC QN AUTO: 33.9 PG (ref 27–33)
MCHC RBC AUTO-ENTMCNC: 33.2 G/DL (ref 32.2–35.5)
MCV RBC AUTO: 102.1 FL (ref 81.4–97.8)
MONOCYTES # BLD AUTO: 0.78 K/UL (ref 0–0.85)
MONOCYTES NFR BLD AUTO: 7.4 % (ref 0–13.4)
NEUTROPHILS # BLD AUTO: 5.07 K/UL (ref 1.82–7.42)
NEUTROPHILS NFR BLD: 47.9 % (ref 44–72)
NRBC # BLD AUTO: 0 K/UL
NRBC BLD-RTO: 0 /100 WBC (ref 0–0.2)
PLATELET # BLD AUTO: 417 K/UL (ref 164–446)
PMV BLD AUTO: 9.6 FL (ref 9–12.9)
POTASSIUM SERPL-SCNC: 3.9 MMOL/L (ref 3.6–5.5)
PROT SERPL-MCNC: 7.5 G/DL (ref 6–8.2)
RBC # BLD AUTO: 4.19 M/UL (ref 4.2–5.4)
SODIUM SERPL-SCNC: 134 MMOL/L (ref 135–145)
WBC # BLD AUTO: 10.6 K/UL (ref 4.8–10.8)

## 2025-05-28 PROCEDURE — 86140 C-REACTIVE PROTEIN: CPT

## 2025-05-28 PROCEDURE — 85025 COMPLETE CBC W/AUTO DIFF WBC: CPT

## 2025-05-28 PROCEDURE — 82306 VITAMIN D 25 HYDROXY: CPT

## 2025-05-28 PROCEDURE — 85652 RBC SED RATE AUTOMATED: CPT

## 2025-05-28 PROCEDURE — 36415 COLL VENOUS BLD VENIPUNCTURE: CPT

## 2025-05-28 PROCEDURE — 80053 COMPREHEN METABOLIC PANEL: CPT

## 2025-06-12 ENCOUNTER — TELEPHONE (OUTPATIENT)
Dept: SLEEP MEDICINE | Facility: MEDICAL CENTER | Age: 70
End: 2025-06-12

## 2025-06-12 ENCOUNTER — OFFICE VISIT (OUTPATIENT)
Dept: SLEEP MEDICINE | Facility: MEDICAL CENTER | Age: 70
End: 2025-06-12
Attending: NURSE PRACTITIONER
Payer: MEDICARE

## 2025-06-12 VITALS
SYSTOLIC BLOOD PRESSURE: 102 MMHG | BODY MASS INDEX: 24.73 KG/M2 | WEIGHT: 131 LBS | OXYGEN SATURATION: 92 % | HEIGHT: 61 IN | HEART RATE: 76 BPM | DIASTOLIC BLOOD PRESSURE: 62 MMHG

## 2025-06-12 DIAGNOSIS — J43.2 CENTRILOBULAR EMPHYSEMA (HCC): Primary | ICD-10-CM

## 2025-06-12 DIAGNOSIS — J90 RECURRENT PLEURAL EFFUSION ON RIGHT: ICD-10-CM

## 2025-06-12 DIAGNOSIS — Z87.891 FORMER SMOKER: ICD-10-CM

## 2025-06-12 PROBLEM — J96.01 ACUTE HYPOXEMIC RESPIRATORY FAILURE (HCC): Status: RESOLVED | Noted: 2023-10-04 | Resolved: 2025-06-12

## 2025-06-12 PROCEDURE — 99212 OFFICE O/P EST SF 10 MIN: CPT | Mod: 25 | Performed by: NURSE PRACTITIONER

## 2025-06-12 PROCEDURE — 3074F SYST BP LT 130 MM HG: CPT | Performed by: NURSE PRACTITIONER

## 2025-06-12 PROCEDURE — 3078F DIAST BP <80 MM HG: CPT | Performed by: NURSE PRACTITIONER

## 2025-06-12 PROCEDURE — 99214 OFFICE O/P EST MOD 30 MIN: CPT | Performed by: NURSE PRACTITIONER

## 2025-06-12 PROCEDURE — 94761 N-INVAS EAR/PLS OXIMETRY MLT: CPT | Performed by: NURSE PRACTITIONER

## 2025-06-12 ASSESSMENT — FIBROSIS 4 INDEX: FIB4 SCORE: 1.19

## 2025-06-12 ASSESSMENT — PATIENT HEALTH QUESTIONNAIRE - PHQ9: CLINICAL INTERPRETATION OF PHQ2 SCORE: 0

## 2025-06-12 NOTE — TELEPHONE ENCOUNTER
Received New Start request via [employer]  for Budeson-Glycopyrrol-Formoterol (BREZTRI AEROSPHERE) 160-9-4.8 MCG/ACT Aerosol . (Quantity:10.7 g, Day Supply:30)     Insurance: [employer] / [employer]  Member ID:  [id number]  BIN: [id number]  PCN: [id number]  Group: [id number]     Ran Test claim via [place] [facility] medication Pays for a $15 copay.     Will release Rx to preferred pharmacy, per patient's request:  [facility]/pharmacy #[id number]     Thank you,   Kyree Duran, [facility]  Pharmacy Liaison

## 2025-06-12 NOTE — PROGRESS NOTES
Chief Complaint   Patient presents with    Follow-Up     Dx/Last seen: Chronic bronchitis, unspecified chronic bronchitis type (HCC) 1/20/25 Hortensia Smith     Tests completed: CT-Chest 2/17/25        HPI:  Cyndee Mosley is a 69 y.o. year old female here today for follow-up on moderate emphysema/COPD and status post pleurodesis January 2024 for recurrent right pleural effusion.  Last OV 1/20/25 with Dr. Smith.     MMRC Grade: 0-1  Exacerbations this year: 0    Using Airsupra 2 puffs twice daily and previously using Breztri 2 puffs twice daily with significant benefit.    Interval events  6/12/2025: Patient presents today noting shortness of breath only with inclines with walking.  She denies issues with ADLs.  No regular cough, phlegm, chest pain, chest tightness or wheezing.  She notes after using the inhaler she does have some throat congestion but minimal.  Prior PFT noted moderate obstruction.  CT scan February 2025 indicated improvement in bilateral pleural effusions but ongoing soft band of tissue in bilateral lower lobes.  Emphysema to the upper lobes.  She has had no significant changes in health since last office visit.  Overall feeling well.  Denies any nighttime breathing issues or waking with headache/shortness of breath.    Pulmonary history:  Former smoker, quit 2023 with 30-pack-year history.  PFT 9/10/2024 indicated FEV1 1.1 L 54%, ratio 56, significant bronchodilator response, air trapping, TLC within normal limits and DLCO mildly reduced at 79%.  CT chest 2/17/25:  1. Decreasing trace pleural effusions.  2. Persistent bands of nodular soft tissue thickening posteriorly in the bilateral lung bases. Continued follow-up recommended.  3. Emphysema. No confluent infiltrate.  4. No change in mildly prominent mediastinal lymph nodes.  5. Gallstones.  ECHO 7/17/24:  Compared to the prior study on 10/5/2023, tricuspid regurgitation is   new.   Normal left ventricular chamber size. Normal left  ventricular wall   thickness. Normal left ventricular systolic function. Visually   estimated ejection fraction is 65-70 %. No regional wall motion   abnormalities. Normal diastolic function.  Mild tricuspid regurgitation with estimated RV systolic pressure of 38   mmHg    ROS: As per HPI and otherwise negative if not stated.    Past Medical History[1]    Past Surgical History[2]    Family History   Problem Relation Age of Onset    Cancer Mother         lymphoma    No Known Problems Father        Social History     Socioeconomic History    Marital status:      Spouse name: Not on file    Number of children: Not on file    Years of education: Not on file    Highest education level: 12th grade   Occupational History    Not on file   Tobacco Use    Smoking status: Former     Current packs/day: 0.00     Average packs/day: 1 pack/day for 30.3 years (30.3 ttl pk-yrs)     Types: Cigarettes     Start date: 1993     Quit date: 10/4/2023     Years since quittin.6    Smokeless tobacco: Never   Vaping Use    Vaping status: Never Used   Substance and Sexual Activity    Alcohol use: Yes     Alcohol/week: 8.4 oz     Types: 14 Cans of beer per week    Drug use: Yes     Types: Marijuana, Oral     Comment: THC/CBD gummies every other day    Sexual activity: Not on file   Other Topics Concern    Not on file   Social History Narrative    Not on file     Social Drivers of Health     Financial Resource Strain: Low Risk  (10/6/2023)    Overall Financial Resource Strain (CARDIA)     Difficulty of Paying Living Expenses: Not hard at all   Food Insecurity: No Food Insecurity (10/6/2023)    Hunger Vital Sign     Worried About Running Out of Food in the Last Year: Never true     Ran Out of Food in the Last Year: Never true   Transportation Needs: No Transportation Needs (10/6/2023)    PRAPARE - Transportation     Lack of Transportation (Medical): No     Lack of Transportation (Non-Medical): No   Physical Activity: Insufficiently  "Active (10/6/2023)    Exercise Vital Sign     Days of Exercise per Week: 2 days     Minutes of Exercise per Session: 20 min   Stress: No Stress Concern Present (10/6/2023)    [employer] [place] [employer] - Occupational Stress Questionnaire     Feeling of Stress : Not at all   Social Connections: Moderately Isolated (10/6/2023)    Social Connection and Isolation Panel [NHANES]     Frequency of Communication with Friends and Family: More than three times a week     Frequency of Social Gatherings with Friends and Family: Three times a week     Attends Mandaeism Services: Never     Active Member of Clubs or Organizations: No     Attends Club or Organization Meetings: Never     Marital Status:    Intimate Partner Violence: Not on file   Housing Stability: Low Risk  (10/6/2023)    Housing Stability Vital Sign     Unable to Pay for Housing in the Last Year: No     Number of Places Lived in the Last Year: 1     Unstable Housing in the Last Year: No       Allergies as of 06/12/2025    (No Known Allergies)        Vitals:  /62   Pulse 76   Ht 1.549 m (5' 1\")   Wt 59.4 kg (131 lb)   SpO2 92%     Current medications as of today Current Medications[3]      Physical Exam:   Gen:           Alert and oriented, No apparent distress. Mood and affect appropriate, normal interaction with examiner.  Eyes:          PERRL, EOM intact, sclere white, conjunctive moist.  Ears:          Not examined.   Hearing:     Grossly intact.  Nose:          Normal, no lesions or deformities.  Dentition:    Not examined.   Oropharynx:   Not examined.   Mallampati Classification: Not examined.   Neck:        Supple, trachea midline, no masses.  Respiratory Effort: No intercostal retractions or use of accessory muscles.   Lung Auscultation:      Clear to auscultation bilaterally but diminished t/o; no rales, rhonchi or wheezing.  CV:            Regular rate and rhythm. No murmurs, rubs or gallops.  Abd:           Not examined. "   Lymphadenopathy: Not examined.   Gait and Station: Normal.  Digits and Nails: No clubbing, cyanosis, petechiae, or nodes.   Cranial Nerves: II-XII grossly intact.  Skin:        No rashes, lesions or ulcers noted.               Ext:           No cyanosis or edema.      Assessment:  1. Centrilobular emphysema (HCC)  Budeson-Glycopyrrol-Formoterol (BREZTRI AEROSPHERE) 160-9-4.8 MCG/ACT Aerosol    PULMONARY FUNCTION TESTS -Test requested: Complete Pulmonary Function Test; Include MIPS/MEPS? No    Multiple Oximetry    CT-CHEST (THORAX) W/O    Albuterol-Budesonide 90-80 MCG/ACT Aerosol    CANCELED: Overnight Oximetry      2. Recurrent pleural effusion on right  CT-CHEST (THORAX) W/O      3. BMI 24.0-24.9, adult        4. Former smoker  CT-CHEST (THORAX) W/O               Immunizations:    Flu:recommend fall 2025  Pneumovax 23:not due  Prevnar 13:not due  PCV 20: recommend  COVID-19: 2022    Plan:  Patient has moderate emphysema on imaging and moderate obstruction on PFT.  Recommend starting Breztri 2 puffs twice daily and Airsupra as needed.  She previously tried Stiolto and found no significant benefit.  Recommend updating PFT September 2025 for comparison.   RX Breztri, Airsupra   PFT due 9/2025  Recurrent pleural effusion has resolved at this point with pleurodesis.  She does have ongoing soft banding to bilateral lower lobes.  Recommend repeat exam in 6 months which is due August 2025 for monitoring.  She recently quit smoking 23 and will continue to benefit from at least annual screening for lung cancer.   CT chest due 8/2025  Encourage healthy diet and activity for conditioning.  Follow-up in 4 months with PFT and CT scan results, sooner if needed.    Please note that this dictation was created using voice recognition software. I have made every reasonable attempt to correct obvious errors, but it is possible there are errors of grammar and possibly content that I did not discover before finalizing the note.            [1]   Past Medical History:  Diagnosis Date    Cataract 2021    Cataract removed left eye    COPD (chronic obstructive pulmonary disease) (HCC)     Pneumonia 2018    Shortness of breath 12/19/2023    mild with exertion   [2]   Past Surgical History:  Procedure Laterality Date    [place] THORACOSCOPY,DX NO BX  1/22/2024    Procedure: THORACOSCOPY WITH DECORTICATION;  Surgeon: Rolan Bonds M.D.;  Location: SURGERY [facility];  Service: General    BREAST RECONSTRUCTION Bilateral     Breast implants    CATARACT EXTRACTION WITH IOL Left     KNEE ARTHROSCOPY Right     meniscus repair   [3]   Current Outpatient Medications   Medication Sig Dispense Refill    Budeson-Glycopyrrol-Formoterol (BREZTRI AEROSPHERE) 160-9-4.8 MCG/ACT Aerosol Inhale 2 Puffs 2 times a day. 10.7 g 11    Albuterol-Budesonide 90-80 MCG/ACT Aerosol Inhale 2 Puffs every four hours as needed (shortness of breath). 10.7 g 11    celecoxib (CELEBREX) 200 MG Cap TAKE 1 CAPSULE BY MOUTH TWICE A DAY WITH FOOD      diclofenac sodium (VOLTAREN) 1 % Gel       folic acid (FOLVITE) 1 MG Tab 1 tablet Orally Once a day for 30 days      ipratropium (ATROVENT) 0.03 % Solution TAKE 1 SPRAY IN EACH NOSTRIL UP TO 3 TIMES A DAY AS NEEDED FOR NASAL DRIP      methotrexate 2.5 MG tablet TAKE 6 TABS BY MOUTH ONCE A WEEK ORAL 30 DAYS      albuterol 108 (90 Base) MCG/ACT Aero Soln inhalation aerosol Inhale 1-2 Puffs every 6 hours as needed for Shortness of Breath.      Prenatal Vit-Fe Fumarate-FA (PRENATAL PO) Take 1 Tablet by mouth every morning.      Ascorbic Acid (VITAMIN C PO) Take 2 Tablets by mouth every morning.      Multiple Vitamins-Minerals (AIRBORNE GUMMIES PO) Take 1 Tablet by mouth every morning.      B Complex Vitamins (VITAMIN B COMPLEX) Tab Take 1 Tablet by mouth every morning.      Cholecalciferol (VITAMIN D3 PO) Take 1 Tablet by mouth every morning.      Misc Natural Products (PUMPKIN SEED OIL PO) Take 1 Tablet by mouth every morning.      Omega-3 Fatty  Acids (FISH OIL PO) Take 1 Capsule by mouth every morning.      Multiple Vitamins-Minerals (HAIR SKIN AND NAILS FORMULA) Tab Take 2 Tablets by mouth every morning.       No current facility-administered medications for this visit.

## 2025-07-22 ENCOUNTER — HOSPITAL ENCOUNTER (OUTPATIENT)
Dept: RADIOLOGY | Facility: MEDICAL CENTER | Age: 70
End: 2025-07-22
Attending: STUDENT IN AN ORGANIZED HEALTH CARE EDUCATION/TRAINING PROGRAM
Payer: MEDICARE

## 2025-07-22 DIAGNOSIS — R22.41 MASS OF RIGHT FOOT: ICD-10-CM

## 2025-08-18 ENCOUNTER — HOSPITAL ENCOUNTER (OUTPATIENT)
Dept: RADIOLOGY | Facility: MEDICAL CENTER | Age: 70
End: 2025-08-18
Attending: NURSE PRACTITIONER
Payer: MEDICARE

## 2025-08-18 DIAGNOSIS — J90 RECURRENT PLEURAL EFFUSION ON RIGHT: ICD-10-CM

## 2025-08-18 DIAGNOSIS — J43.2 CENTRILOBULAR EMPHYSEMA (HCC): ICD-10-CM

## 2025-08-18 DIAGNOSIS — Z87.891 FORMER SMOKER: ICD-10-CM

## 2025-08-18 PROCEDURE — 71250 CT THORAX DX C-: CPT

## (undated) DEVICE — SPONGE DRAIN 4 X 4IN 6-PLY - (2/PK25PK/BX12BX/CS)

## (undated) DEVICE — SPONGE GAUZE NON-STERILE 4X4 - (2000/CA 10PK/CA)

## (undated) DEVICE — SODIUM CHL IRRIGATION 0.9% 1000ML (12EA/CA)

## (undated) DEVICE — COVER LIGHT HANDLE ALC PLUS DISP (18EA/BX)

## (undated) DEVICE — DRAPE IOBAN II INCISE 23X17 - (10EA/BX 4BX/CA)

## (undated) DEVICE — GOWN WARMING STANDARD FLEX - (30/CA)

## (undated) DEVICE — CLEANER ELECTRO-SURGICAL TIP - (25/BX 4BX/CA)

## (undated) DEVICE — ELECTRODE DUAL RETURN W/ CORD - (50/PK)

## (undated) DEVICE — SUTURE GENERAL

## (undated) DEVICE — HEMOSTAT SURG ABSORBABLE - 4 X 8 IN SURGICEL (24EA/CA)

## (undated) DEVICE — SET LEADWIRE 5 LEAD BEDSIDE DISPOSABLE ECG (1SET OF 5/EA)

## (undated) DEVICE — SYSTEM CLEARIFY VISUALIZATION (10EA/PK)

## (undated) DEVICE — CONNECTOR Y TBG CRTY 5 IN 1 STERILE (50EA/CA)

## (undated) DEVICE — TUBING CLEARLINK DUO-VENT - C-FLO (48EA/CA)

## (undated) DEVICE — SET SUCTION/IRRIGATION WITH DISPOSABLE TIP (6/CA )PART #0250-070-520 IS A SUB

## (undated) DEVICE — BOVIE BLADE COATED &INSULATED - 25/PK

## (undated) DEVICE — BLADE SURGICAL #15 - (50/BX 3BX/CA)

## (undated) DEVICE — SUTURE 4-0 MONOCRYL PLUS PS-2 - 27 INCH (36/BX)

## (undated) DEVICE — CHLORAPREP 26 ML APPLICATOR - ORANGE TINT(25/CA)

## (undated) DEVICE — SOD. CHL. INJ. 0.9% 1000 ML - (14EA/CA 60CA/PF)

## (undated) DEVICE — PACK LAP CHOLE OR - (2EA/CA)

## (undated) DEVICE — SLEEVE, VASO, THIGH, MED

## (undated) DEVICE — SET EXTENSION WITH 2 PORTS (48EA/CA) ***PART #2C8610 IS A SUBSTITUTE*****

## (undated) DEVICE — CONTAINER SPECIMEN BAG OR - STERILE 4 OZ W/LID (100EA/CA)

## (undated) DEVICE — CANISTER SUCTION 3000ML MECHANICAL FILTER AUTO SHUTOFF MEDI-VAC NONSTERILE LF DISP  (40EA/CA)

## (undated) DEVICE — DRAPE LARGE 3 QUARTER - (20/CA)

## (undated) DEVICE — DRAPE CHEST/BREAST - (12EA/CA)

## (undated) DEVICE — LACTATED RINGERS INJ 1000 ML - (14EA/CA 60CA/PF)

## (undated) DEVICE — DRAPESURG STERI-DRAPE LONG - (10/BX 4BX/CA)

## (undated) DEVICE — BAG RETRIEVAL 10ML (10EA/BX)

## (undated) DEVICE — TROCAR THORACOPORT SOFT 12MM (12EA/CA)

## (undated) DEVICE — SUCTION INSTRUMENT YANKAUER BULBOUS TIP W/O VENT (50EA/CA)

## (undated) DEVICE — SENSOR OXIMETER ADULT SPO2 RD SET (20EA/BX)

## (undated) DEVICE — ELECTRODE 5MM LHK LAPSCP STERILE DISP- MEGADYNE  (5/CA)

## (undated) DEVICE — GLOVE BIOGEL SZ 7 SURGICAL PF LTX - (50PR/BX 4BX/CA)